# Patient Record
Sex: MALE | Race: OTHER | NOT HISPANIC OR LATINO
[De-identification: names, ages, dates, MRNs, and addresses within clinical notes are randomized per-mention and may not be internally consistent; named-entity substitution may affect disease eponyms.]

---
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
## 2021-08-16 ENCOUNTER — APPOINTMENT (OUTPATIENT)
Dept: FAMILY MEDICINE | Facility: CLINIC | Age: 21
End: 2021-08-16

## 2021-08-27 ENCOUNTER — APPOINTMENT (OUTPATIENT)
Dept: INTERNAL MEDICINE | Facility: CLINIC | Age: 21
End: 2021-08-27

## 2022-04-07 ENCOUNTER — APPOINTMENT (OUTPATIENT)
Dept: FAMILY MEDICINE | Facility: CLINIC | Age: 22
End: 2022-04-07
Payer: COMMERCIAL

## 2022-04-07 VITALS
SYSTOLIC BLOOD PRESSURE: 108 MMHG | OXYGEN SATURATION: 99 % | HEIGHT: 66 IN | DIASTOLIC BLOOD PRESSURE: 68 MMHG | BODY MASS INDEX: 18.8 KG/M2 | HEART RATE: 76 BPM | WEIGHT: 117 LBS

## 2022-04-07 DIAGNOSIS — Z00.00 ENCOUNTER FOR GENERAL ADULT MEDICAL EXAMINATION W/OUT ABNORMAL FINDINGS: ICD-10-CM

## 2022-04-07 PROCEDURE — 99395 PREV VISIT EST AGE 18-39: CPT

## 2022-04-07 NOTE — HISTORY OF PRESENT ILLNESS
[FreeTextEntry1] : annual [de-identified] : 21 yo M with no significant PMH presents for annual.\par \par Lately he has been having issue sleeping. He takes melatonin, falls asleep around 4am. If he does not take melatonin, he will go to sleep at 7am. He will wake up around 11am-12pm. Sometimes, if he does not take melatonin, he will wake at 6pm. He is trying to regulate his circadian rhythm by exercising, trying to wake early. At night, he tends to eat, mostly microwaveable meals, on his phone. He does report having a lot of thoughts. He is psychology major in college, feels he has some depression. Interested in starting SSRI.\par \par Sexually active with partner. Uses protection. Declines STD testing today.\par

## 2022-05-19 ENCOUNTER — APPOINTMENT (OUTPATIENT)
Dept: FAMILY MEDICINE | Facility: CLINIC | Age: 22
End: 2022-05-19

## 2022-07-03 ENCOUNTER — TRANSCRIPTION ENCOUNTER (OUTPATIENT)
Age: 22
End: 2022-07-03

## 2022-07-04 ENCOUNTER — EMERGENCY (EMERGENCY)
Facility: HOSPITAL | Age: 22
LOS: 1 days | Discharge: HOME | End: 2022-07-04
Attending: EMERGENCY MEDICINE | Admitting: EMERGENCY MEDICINE

## 2022-07-04 VITALS
OXYGEN SATURATION: 100 % | SYSTOLIC BLOOD PRESSURE: 124 MMHG | RESPIRATION RATE: 18 BRPM | TEMPERATURE: 98 F | DIASTOLIC BLOOD PRESSURE: 78 MMHG | HEART RATE: 90 BPM

## 2022-07-04 PROCEDURE — 99283 EMERGENCY DEPT VISIT LOW MDM: CPT

## 2022-07-04 RX ORDER — LIDOCAINE 4 G/100G
10 CREAM TOPICAL ONCE
Refills: 0 | Status: COMPLETED | OUTPATIENT
Start: 2022-07-04 | End: 2022-07-04

## 2022-07-04 RX ORDER — DEXAMETHASONE 0.5 MG/5ML
6 ELIXIR ORAL ONCE
Refills: 0 | Status: COMPLETED | OUTPATIENT
Start: 2022-07-04 | End: 2022-07-04

## 2022-07-04 RX ORDER — DEXAMETHASONE 0.5 MG/5ML
6 ELIXIR ORAL ONCE
Refills: 0 | Status: DISCONTINUED | OUTPATIENT
Start: 2022-07-04 | End: 2022-07-04

## 2022-07-04 RX ADMIN — Medication 6 MILLIGRAM(S): at 09:01

## 2022-07-04 RX ADMIN — LIDOCAINE 10 MILLILITER(S): 4 CREAM TOPICAL at 09:02

## 2022-07-04 NOTE — ED PROVIDER NOTE - PHYSICAL EXAMINATION
GEN: Patient awake alert NAD.   HEENT: normocephalic, atraumatic, EOMI, no scleral icterus, moist MM, no tonsilar exudates, no tender anterior and posterior cervical and supraclavicular LAD, + mildly erythematous mass on soft palette.   CARDIAC: RRR, S1, S2, no murmur.   PULM: CTA B/L no wheeze, rhonchi, rales.   ABD: soft NT, ND, no rebound no guarding  MSK: Moving all extremities, no edema. 5/5 strength and full ROM in all extremities.   NEURO: A&Ox3, gait normal, no focal neurological deficits, CN 2-12 grossly intact  SKIN: warm, dry, no rash.

## 2022-07-04 NOTE — ED PROVIDER NOTE - NSFOLLOWUPINSTRUCTIONS_ED_ALL_ED_FT
** You are prescribed _. Take as directed by your pharmacists.   ** Take over the counter Tylenol or Ibuprofen for pain -- follow dosing directions on original bottle.    ** Follow up with your primary care doctor in the next 72 hours.   ** A rapid follow up appointment has been requested. The coordinator will call you with an appointment time with ENT.   ** Go to the nearest Emergency Department if you experience any new or concerning symptoms, such as:   - worsening pain  - difficulty breathing  - unable to eat or drink  - fever, chills ** You have some tests pending that take 48 hours to come back. We will contact you if you have any concerning results and need additional treatment or tests.     ** Take over the counter Tylenol or Ibuprofen for pain -- follow dosing directions on original bottle.    ** Follow up with your primary care doctor in the next 72 hours.   ** A rapid follow up appointment has been requested. The coordinator will call you with an appointment time with ENT.   ** Go to the nearest Emergency Department if you experience any new or concerning symptoms, such as:   - worsening pain  - difficulty breathing  - unable to eat or drink  - fever, chills

## 2022-07-04 NOTE — ED PROVIDER NOTE - PROGRESS NOTE DETAILS
Suzie Melton M.D. Tox Fellow  Alerted by RN that patient has questions about taking decadron after taking it. Per RN, pt's mother was n the phone telling him not to take steroids because she developed pulmonary fibrosis from taking steroids. pt took decadron and is now concerned he will develop long term effects. Explained to patient that one dose of decadron in a young healthy individual with otherwise no known medical problems is low risk and will help reduct inflammation and help with his throat swelling. All questions by dad and patient answered. Pt agreeable to going home.

## 2022-07-04 NOTE — ED PROVIDER NOTE - ATTENDING CONTRIBUTION TO CARE
Patient is a 23 yo M with no chronic medical problems here for evaluation of sore throat x 2 days. Patient has a fever of 102. He states he has pain with swallowing. No drooling. No dental pain. No swelling. + cough and congestion. He took a home covid test that was negative. Denies any sick contacts. He is vaccinated. No nausea, vomiting or diarrhea.     VS noted  Gen. no acute distress, Non toxic   HEENT: EOMI, mmm, pharynx with what appears like raised papules. No exudate. No stridor. Neck is supple.   Lungs: CTAB/L no C/ W /R   CVS: RRR   Abd; Soft non tender, non distended   Ext: no edema  Skin: no rash  Neuro AAOx3 non focal clear speech  a/p: pharyngitis - ? HSV. No mouth lesions. Will send culture, give viscous lidocaine and decadron. Will refer to ENT.   - Regina BUSH

## 2022-07-04 NOTE — ED ADULT NURSE NOTE - OBJECTIVE STATEMENT
Pt c/o throat pain, fevers @ home. Pt c/o throat pain, fevers @ home. Spoke on the phone with pt's mother , explained mother the reason pt getting Decadron and Lidocaine. pt agreed to take it.

## 2022-07-04 NOTE — ED PROVIDER NOTE - PATIENT PORTAL LINK FT
You can access the FollowMyHealth Patient Portal offered by [facility] by registering at the following website: [email or web address]. By joining [employer]’s FollowMyHealth portal, you will also be able to view your health information using other applications (apps) compatible with our system.

## 2022-07-04 NOTE — ED PROVIDER NOTE - CLINICAL SUMMARY MEDICAL DECISION MAKING FREE TEXT BOX
21 yo M no sig pmhx, pw sore throat x 2 days. no exudates, + cough, no cervical tender LAD, afebrile in the ED. ddx: URI, viral pharyngitis. Pt offered but declined viral swab. Anticipate DC home with ENT followup.

## 2022-07-04 NOTE — ED PROVIDER NOTE - NS ED ROS FT
GENERAL: + fever   ENT: + sore throat  CARDIAC: no chest pain/pressure, SOB, lower extremity swelling  PULMONARY: + cough, no SOB  GI: no abdominal pain, n/v/d  SKIN: no rashes, no ecchymosis  NEURO: no headache, lightheadedness  MSK: No joint pain, myalgia, weakness.

## 2022-07-04 NOTE — ED PROVIDER NOTE - OBJECTIVE STATEMENT
23 yo M no sig pmhx, pw sore throat x 2 days. Pt endorse fever Tmax 102, currently afebrile due to ibuprofen taken prior to arrival. Pt endorse cough, nasal congestion, no gi symptoms, no loss of smell, no headache, no sick contacts at home.

## 2022-07-06 LAB
CULTURE RESULTS: SIGNIFICANT CHANGE UP
SPECIMEN SOURCE: SIGNIFICANT CHANGE UP

## 2022-07-07 ENCOUNTER — APPOINTMENT (OUTPATIENT)
Dept: FAMILY MEDICINE | Facility: CLINIC | Age: 22
End: 2022-07-07

## 2022-07-07 VITALS
DIASTOLIC BLOOD PRESSURE: 72 MMHG | OXYGEN SATURATION: 99 % | HEIGHT: 66 IN | BODY MASS INDEX: 18.96 KG/M2 | WEIGHT: 118 LBS | TEMPERATURE: 97.7 F | SYSTOLIC BLOOD PRESSURE: 117 MMHG | HEART RATE: 64 BPM

## 2022-07-07 DIAGNOSIS — J06.9 ACUTE UPPER RESPIRATORY INFECTION, UNSPECIFIED: ICD-10-CM

## 2022-07-07 PROBLEM — Z78.9 OTHER SPECIFIED HEALTH STATUS: Chronic | Status: ACTIVE | Noted: 2022-07-04

## 2022-07-07 PROCEDURE — 99214 OFFICE O/P EST MOD 30 MIN: CPT

## 2022-07-07 RX ORDER — FLUTICASONE PROPIONATE 50 UG/1
50 SPRAY, METERED NASAL
Qty: 1 | Refills: 1 | Status: ACTIVE | COMMUNITY
Start: 2022-07-07 | End: 1900-01-01

## 2022-07-07 RX ORDER — AMOXICILLIN AND CLAVULANATE POTASSIUM 875; 125 MG/1; MG/1
875-125 TABLET, COATED ORAL
Qty: 10 | Refills: 0 | Status: COMPLETED | COMMUNITY
Start: 2022-07-07 | End: 2022-07-12

## 2022-07-07 NOTE — HISTORY OF PRESENT ILLNESS
[FreeTextEntry8] : cc-- sore throat\par \par 21 yo M presents for itchy throat, congestion x 1 week. Tmax 102, 2 days fever. Neg covid PCR. Went to ED, was rx steroids. Pt reports does not help. Still with sx. Pt reports no difference in sx from last week and this week. \par \par Robitussin, mucinex not much help.

## 2022-08-05 ENCOUNTER — APPOINTMENT (OUTPATIENT)
Dept: FAMILY MEDICINE | Facility: CLINIC | Age: 22
End: 2022-08-05

## 2022-08-05 DIAGNOSIS — J02.9 ACUTE PHARYNGITIS, UNSPECIFIED: ICD-10-CM

## 2022-08-05 DIAGNOSIS — R50.9 FEVER, UNSPECIFIED: ICD-10-CM

## 2022-08-05 PROCEDURE — 99441: CPT

## 2022-08-05 NOTE — HISTORY OF PRESENT ILLNESS
[FreeTextEntry8] : Due to technical difficulties with the camera, encounter converted to telephone encounter.\par \par cc-- fever, sore throat\par \par 21 yo M with fever, sore throat, T 100.4 this morning. Sx started this morning. He took a home test was negative. He had covid last month. Works at a pool.

## 2022-08-11 ENCOUNTER — NON-APPOINTMENT (OUTPATIENT)
Age: 22
End: 2022-08-11

## 2022-10-15 ENCOUNTER — TRANSCRIPTION ENCOUNTER (OUTPATIENT)
Age: 22
End: 2022-10-15

## 2022-11-29 ENCOUNTER — APPOINTMENT (OUTPATIENT)
Dept: INTERNAL MEDICINE | Facility: CLINIC | Age: 22
End: 2022-11-29

## 2022-12-05 ENCOUNTER — APPOINTMENT (OUTPATIENT)
Dept: INTERNAL MEDICINE | Facility: CLINIC | Age: 22
End: 2022-12-05

## 2022-12-05 VITALS
SYSTOLIC BLOOD PRESSURE: 92 MMHG | WEIGHT: 115 LBS | BODY MASS INDEX: 18.48 KG/M2 | HEART RATE: 86 BPM | OXYGEN SATURATION: 99 % | HEIGHT: 66 IN | DIASTOLIC BLOOD PRESSURE: 62 MMHG

## 2022-12-05 DIAGNOSIS — L65.9 NONSCARRING HAIR LOSS, UNSPECIFIED: ICD-10-CM

## 2022-12-05 PROCEDURE — G0008: CPT

## 2022-12-05 PROCEDURE — 90686 IIV4 VACC NO PRSV 0.5 ML IM: CPT

## 2022-12-05 PROCEDURE — 90472 IMMUNIZATION ADMIN EACH ADD: CPT

## 2022-12-05 PROCEDURE — G0444 DEPRESSION SCREEN ANNUAL: CPT | Mod: 59

## 2022-12-05 PROCEDURE — 99203 OFFICE O/P NEW LOW 30 MIN: CPT | Mod: 25

## 2022-12-05 PROCEDURE — 90715 TDAP VACCINE 7 YRS/> IM: CPT

## 2022-12-05 NOTE — HEALTH RISK ASSESSMENT
[Good] : ~his/her~ current health as good [Never] : Never [No] : In the past 12 months have you used drugs other than those required for medical reasons? No [0] : 2) Feeling down, depressed, or hopeless: Not at all (0) [PHQ-2 Negative - No further assessment needed] : PHQ-2 Negative - No further assessment needed [With Family] : lives with family [Student] : student [Single] : single [Feels Safe at Home] : Feels safe at home [Fully functional (bathing, dressing, toileting, transferring, walking, feeding)] : Fully functional (bathing, dressing, toileting, transferring, walking, feeding) [Fully functional (using the telephone, shopping, preparing meals, housekeeping, doing laundry, using] : Fully functional and needs no help or supervision to perform IADLs (using the telephone, shopping, preparing meals, housekeeping, doing laundry, using transportation, managing medications and managing finances) [Smoke Detector] : smoke detector [Carbon Monoxide Detector] : carbon monoxide detector [Seat Belt] :  uses seat belt [Sunscreen] : uses sunscreen [Fair] :  ~his/her~ mood as fair [Audit-CScore] : 0 [de-identified] : not exercising now, busy with school. in school for premed [de-identified] : sometimes eating 2 meals a day. trying to gain weight. eating processed foods that are easy.  [[id number]] : 0 [HIV test declined] : HIV test declined [Hepatitis C test declined] : Hepatitis C test declined [Sexually Active] : not sexually active [High Risk Behavior] : no high risk behavior [Reports changes in hearing] : Reports no changes in hearing [Reports changes in vision] : Reports no changes in vision [Reports changes in dental health] : Reports no changes in dental health [Guns at Home] : no guns at home [de-identified] : living with family, commuting to [place] [FreeTextEntry2] : student; premed

## 2022-12-05 NOTE — PHYSICAL EXAM
[No Acute Distress] : no acute distress [Well Nourished] : well nourished [Well Developed] : well developed [Well-Appearing] : well-appearing [Normal Sclera/Conjunctiva] : normal sclera/conjunctiva [PERRL] : pupils equal round and reactive to light [EOMI] : extraocular movements intact [Normal Outer Ear/Nose] : the outer ears and nose were normal in appearance [Normal Oropharynx] : the oropharynx was normal [No JVD] : no jugular venous distention [No Lymphadenopathy] : no lymphadenopathy [Supple] : supple [Thyroid Normal, No Nodules] : the thyroid was normal and there were no nodules present [No Respiratory Distress] : no respiratory distress  [No Accessory Muscle Use] : no accessory muscle use [Clear to Auscultation] : lungs were clear to auscultation bilaterally [Normal Rate] : normal rate  [Regular Rhythm] : with a regular rhythm [Normal S1, S2] : normal S1 and S2 [No Murmur] : no murmur heard [No Varicosities] : no varicosities [No Edema] : there was no peripheral edema [No Palpable Aorta] : no palpable aorta [Soft] : abdomen soft [Non Tender] : non-tender [Non-distended] : non-distended [No Masses] : no abdominal mass palpated [No HSM] : no HSM [Normal Supraclavicular Nodes] : no supraclavicular lymphadenopathy [Normal Posterior Cervical Nodes] : no posterior cervical lymphadenopathy [Normal Anterior Cervical Nodes] : no anterior cervical lymphadenopathy [No CVA Tenderness] : no CVA  tenderness [No Spinal Tenderness] : no spinal tenderness [No Joint Swelling] : no joint swelling [Grossly Normal Strength/Tone] : grossly normal strength/tone [No Rash] : no rash [Coordination Grossly Intact] : coordination grossly intact [No Focal Deficits] : no focal deficits [Normal Gait] : normal gait [Normal Insight/Judgement] : insight and judgment were intact [de-identified] : anxious affect

## 2022-12-05 NOTE — ASSESSMENT
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 Anxiety\par -BH referral given to pt\par -declined medication\par -d/w pt therapy confidential, need not share with any other person\par \par 2. HCM\par -HIV/HCV screening\par -tdap - adacel today\par -flu vaccine - fluzone today\par -COVID vaccine - completed, booster completed\par

## 2022-12-05 NOTE — HISTORY OF PRESENT ILLNESS
[FreeTextEntry1] : Visit to establish care with new primary care doctor\par Reports CPE not covered, last was in 04/2022 with Dr. Arguello. \par New patient appointment.  [de-identified] : 22M presents for new patient appointment\par \par Reviewed note from Dr. Arguello 08/05/2022  and 04/07/2022 - issues sleeping, taking melatonin, psychology major in college, depressive symptoms and SSR started, eating microwavable meals, phone use\par \par Patient noticing hair thinning, and falling out. Noticing hair fall in shower. He started noticing it in June. Reports anxiety, insomnia. Was suggested to see therapist by friends. They say they aren't professionals. Pt reluctant due to stigma. Reports sertraline caused vomiting in the past. \par \par PMH: none\par PSH: none\par FH: parents are healthy, no medical problems\par SH: see below\par \par Medications: none\par Allergies: none\par

## 2023-03-10 ENCOUNTER — APPOINTMENT (OUTPATIENT)
Dept: INTERNAL MEDICINE | Facility: CLINIC | Age: 23
End: 2023-03-10
Payer: COMMERCIAL

## 2023-03-10 VITALS
HEIGHT: 66 IN | WEIGHT: 117 LBS | DIASTOLIC BLOOD PRESSURE: 62 MMHG | TEMPERATURE: 97.5 F | OXYGEN SATURATION: 98 % | SYSTOLIC BLOOD PRESSURE: 108 MMHG | HEART RATE: 90 BPM | BODY MASS INDEX: 18.8 KG/M2

## 2023-03-10 DIAGNOSIS — F32.A ANXIETY DISORDER, UNSPECIFIED: ICD-10-CM

## 2023-03-10 DIAGNOSIS — E55.9 VITAMIN D DEFICIENCY, UNSPECIFIED: ICD-10-CM

## 2023-03-10 DIAGNOSIS — F41.9 ANXIETY DISORDER, UNSPECIFIED: ICD-10-CM

## 2023-03-10 PROCEDURE — 99213 OFFICE O/P EST LOW 20 MIN: CPT | Mod: 25

## 2023-03-10 PROCEDURE — 99395 PREV VISIT EST AGE 18-39: CPT | Mod: 25

## 2023-03-10 RX ORDER — HYDROXYZINE HYDROCHLORIDE 25 MG/1
25 TABLET ORAL
Qty: 60 | Refills: 1 | Status: ACTIVE | COMMUNITY
Start: 2022-04-07 | End: 1900-01-01

## 2023-03-10 RX ORDER — SERTRALINE 25 MG/1
25 TABLET, FILM COATED ORAL
Qty: 60 | Refills: 1 | Status: COMPLETED | COMMUNITY
Start: 2022-04-07 | End: 2023-03-10

## 2023-03-10 NOTE — HISTORY OF PRESENT ILLNESS
[FreeTextEntry8] : cc-- chest pain, physical\par \par 21 yo M with depression, having chest pains for past few days, started smoking cigarettes for past 2 weeks. Chest pain is sharp, has SOB with deep breathing. No tenderness point or reproducible pain. No FMH blood clots. Denies any calf tenderness/swelling. Now neck pain. Follows with therapist at school, but not consistent therapist. Not finding it helpful. Was on sertraline in past, but did not tolerate med so he stopped. Hydroxyzine did help with sleep but was too groggy. He finds himself drinking and smoking. 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 + stressors.

## 2023-03-13 LAB
25(OH)D3 SERPL-MCNC: 16.6 NG/ML
ALBUMIN SERPL ELPH-MCNC: 5.2 G/DL
ALP BLD-CCNC: 93 U/L
ALT SERPL-CCNC: 14 U/L
ANION GAP SERPL CALC-SCNC: 15 MMOL/L
AST SERPL-CCNC: 18 U/L
BASOPHILS # BLD AUTO: 0.02 K/UL
BASOPHILS NFR BLD AUTO: 0.3 %
BILIRUB SERPL-MCNC: 0.5 MG/DL
BUN SERPL-MCNC: 16 MG/DL
CALCIUM SERPL-MCNC: 10.4 MG/DL
CHLORIDE SERPL-SCNC: 105 MMOL/L
CHOLEST SERPL-MCNC: 171 MG/DL
CO2 SERPL-SCNC: 24 MMOL/L
CREAT SERPL-MCNC: 0.91 MG/DL
EGFR: 123 ML/MIN/1.73M2
EOSINOPHIL # BLD AUTO: 0.04 K/UL
EOSINOPHIL NFR BLD AUTO: 0.6 %
ESTIMATED AVERAGE GLUCOSE: 108 MG/DL
GLUCOSE SERPL-MCNC: 82 MG/DL
HBA1C MFR BLD HPLC: 5.4 %
HCT VFR BLD CALC: 46.6 %
HDLC SERPL-MCNC: 52 MG/DL
HGB BLD-MCNC: 15 G/DL
IMM GRANULOCYTES NFR BLD AUTO: 0.5 %
LDLC SERPL CALC-MCNC: 89 MG/DL
LYMPHOCYTES # BLD AUTO: 1.76 K/UL
LYMPHOCYTES NFR BLD AUTO: 26.9 %
MAN DIFF?: NORMAL
MCHC RBC-ENTMCNC: 29.1 PG
MCHC RBC-ENTMCNC: 32.2 GM/DL
MCV RBC AUTO: 90.5 FL
MONOCYTES # BLD AUTO: 0.46 K/UL
MONOCYTES NFR BLD AUTO: 7 %
NEUTROPHILS # BLD AUTO: 4.23 K/UL
NEUTROPHILS NFR BLD AUTO: 64.7 %
NONHDLC SERPL-MCNC: 119 MG/DL
PLATELET # BLD AUTO: 311 K/UL
POTASSIUM SERPL-SCNC: 4 MMOL/L
PROT SERPL-MCNC: 7 G/DL
RBC # BLD: 5.15 M/UL
RBC # FLD: 12.5 %
SODIUM SERPL-SCNC: 143 MMOL/L
TRIGL SERPL-MCNC: 147 MG/DL
TSH SERPL-ACNC: 0.82 UIU/ML
VIT B12 SERPL-MCNC: 331 PG/ML
WBC # FLD AUTO: 6.54 K/UL

## 2023-03-14 ENCOUNTER — NON-APPOINTMENT (OUTPATIENT)
Age: 23
End: 2023-03-14

## 2023-03-16 ENCOUNTER — TRANSCRIPTION ENCOUNTER (OUTPATIENT)
Age: 23
End: 2023-03-16

## 2023-04-11 ENCOUNTER — APPOINTMENT (OUTPATIENT)
Dept: INTERNAL MEDICINE | Facility: CLINIC | Age: 23
End: 2023-04-11

## 2023-07-18 ENCOUNTER — APPOINTMENT (OUTPATIENT)
Dept: INTERNAL MEDICINE | Facility: CLINIC | Age: 23
End: 2023-07-18
Payer: COMMERCIAL

## 2023-07-18 VITALS
SYSTOLIC BLOOD PRESSURE: 108 MMHG | HEART RATE: 76 BPM | OXYGEN SATURATION: 98 % | BODY MASS INDEX: 20.25 KG/M2 | WEIGHT: 126 LBS | DIASTOLIC BLOOD PRESSURE: 58 MMHG | HEIGHT: 66 IN

## 2023-07-18 DIAGNOSIS — R07.1 CHEST PAIN ON BREATHING: ICD-10-CM

## 2023-07-18 DIAGNOSIS — F17.200 NICOTINE DEPENDENCE, UNSPECIFIED, UNCOMPLICATED: ICD-10-CM

## 2023-07-18 PROCEDURE — 99214 OFFICE O/P EST MOD 30 MIN: CPT

## 2023-07-18 RX ORDER — BUPROPION HYDROCHLORIDE 150 MG/1
150 TABLET, EXTENDED RELEASE ORAL
Qty: 90 | Refills: 3 | Status: ACTIVE | COMMUNITY
Start: 2023-07-18 | End: 1900-01-01

## 2023-07-18 NOTE — HISTORY OF PRESENT ILLNESS
[FreeTextEntry1] : chest pain [de-identified] : 22 yo M with chest pain over left side of chest for past month. Smokes 3-4 cigs/day. No SOB, no TOTH. Not worse with movement. No cough. + stressors. \par \par Pt smokes due to stress. Wants to quit smoking. Interested in trying wellbutrin.

## 2023-12-21 ENCOUNTER — APPOINTMENT (OUTPATIENT)
Dept: INTERNAL MEDICINE | Facility: CLINIC | Age: 23
End: 2023-12-21
Payer: COMMERCIAL

## 2023-12-21 VITALS
SYSTOLIC BLOOD PRESSURE: 100 MMHG | TEMPERATURE: 96 F | HEART RATE: 93 BPM | DIASTOLIC BLOOD PRESSURE: 60 MMHG | BODY MASS INDEX: 18.8 KG/M2 | WEIGHT: 117 LBS | HEIGHT: 66 IN | OXYGEN SATURATION: 99 %

## 2023-12-21 DIAGNOSIS — J34.89 OTHER SPECIFIED DISORDERS OF NOSE AND NASAL SINUSES: ICD-10-CM

## 2023-12-21 DIAGNOSIS — R05.9 COUGH, UNSPECIFIED: ICD-10-CM

## 2023-12-21 DIAGNOSIS — R09.81 NASAL CONGESTION: ICD-10-CM

## 2023-12-21 PROCEDURE — 99214 OFFICE O/P EST MOD 30 MIN: CPT

## 2023-12-21 RX ORDER — BENZONATATE 100 MG/1
100 CAPSULE ORAL 3 TIMES DAILY
Qty: 60 | Refills: 0 | Status: DISCONTINUED | COMMUNITY
Start: 2022-08-11 | End: 2023-12-21

## 2023-12-21 RX ORDER — AMOXICILLIN AND CLAVULANATE POTASSIUM 875; 125 MG/1; MG/1
875-125 TABLET, COATED ORAL
Qty: 14 | Refills: 0 | Status: ACTIVE | COMMUNITY
Start: 2023-12-21 | End: 1900-01-01

## 2023-12-21 NOTE — HISTORY OF PRESENT ILLNESS
[Cold Symptoms] : cold symptoms [Moderate] : moderate [___ Weeks ago] :  [unfilled] weeks ago [Congestion] : congestion [Cough] : cough [Shortness Of Breath] : no shortness of breath [Fever] : no fever [OTC Remedies] : OTC remedies [Worsening] : worsening

## 2023-12-21 NOTE — REVIEW OF SYSTEMS
Patient is here today for follow up of right great toe pain. He was in [place] on vacation riding mountain bikes, when he fell falling onto his right great toe. This happened on 8/3.  He has had some delayed healing and is here for x-rays and exam.  Exam today shows no point tenderness, full painless range of motion, normal pulses and sensation.    X-rays done and images viewed by me show a well healing Salter Manzo II fracture of the right great toe.  Patient may continue or resume activities as tolerated.  Return to clinic prn.      [Cough] : cough

## 2023-12-21 NOTE — ASSESSMENT
[FreeTextEntry1] : BRONCHITIS   DID a COVID PCR test, AND STREP THROAT TEST  done at office today. F/U 1 WK IF NOT BETTER If patient develops SOB, patient will call me All questions and concerns were discussed.  amox RX SENT DIRECTIONS DISCUSSED AND EXPLAINED WITH PATIENT

## 2023-12-21 NOTE — PHYSICAL EXAM
[Normal TMs] : both tympanic membranes were normal [Clear to Auscultation] : lungs were clear to auscultation bilaterally [Normal S1, S2] : normal S1 and S2 [Normal] : affect was normal and insight and judgment were intact

## 2023-12-22 LAB
INFLUENZA A RESULT: NOT DETECTED
INFLUENZA B RESULT: NOT DETECTED
RESP SYN VIRUS RESULT: NOT DETECTED
S PYO DNA THROAT QL NAA+PROBE: NOT DETECTED
SARS-COV-2 RESULT: NOT DETECTED

## 2024-04-05 ENCOUNTER — APPOINTMENT (OUTPATIENT)
Dept: INTERNAL MEDICINE | Facility: CLINIC | Age: 24
End: 2024-04-05
Payer: COMMERCIAL

## 2024-04-05 VITALS
OXYGEN SATURATION: 98 % | SYSTOLIC BLOOD PRESSURE: 118 MMHG | BODY MASS INDEX: 19.61 KG/M2 | HEART RATE: 88 BPM | WEIGHT: 122 LBS | DIASTOLIC BLOOD PRESSURE: 71 MMHG | HEIGHT: 66 IN

## 2024-04-05 DIAGNOSIS — Z02.89 ENCOUNTER FOR OTHER ADMINISTRATIVE EXAMINATIONS: ICD-10-CM

## 2024-04-05 PROCEDURE — G2211 COMPLEX E/M VISIT ADD ON: CPT

## 2024-04-05 PROCEDURE — 99213 OFFICE O/P EST LOW 20 MIN: CPT

## 2024-04-05 NOTE — HISTORY OF PRESENT ILLNESS
[FreeTextEntry1] : health certification form completion [de-identified] : 25 yo M with anxiety/depression presents for health certification form completion.  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 Pt has no hx cough/abnormal weight loss. Pt not sexually active. Declines STD testing.

## 2024-06-18 ENCOUNTER — APPOINTMENT (OUTPATIENT)
Dept: INTERNAL MEDICINE | Facility: CLINIC | Age: 24
End: 2024-06-18

## 2024-12-18 ENCOUNTER — APPOINTMENT (OUTPATIENT)
Dept: INTERNAL MEDICINE | Facility: CLINIC | Age: 24
End: 2024-12-18
Payer: COMMERCIAL

## 2024-12-18 VITALS
HEART RATE: 69 BPM | DIASTOLIC BLOOD PRESSURE: 73 MMHG | TEMPERATURE: 97.9 F | SYSTOLIC BLOOD PRESSURE: 125 MMHG | OXYGEN SATURATION: 100 % | BODY MASS INDEX: 19.29 KG/M2 | HEIGHT: 66 IN | WEIGHT: 120 LBS

## 2024-12-18 DIAGNOSIS — Z00.00 ENCOUNTER FOR GENERAL ADULT MEDICAL EXAMINATION W/OUT ABNORMAL FINDINGS: ICD-10-CM

## 2024-12-18 PROCEDURE — 99395 PREV VISIT EST AGE 18-39: CPT

## 2025-03-17 ENCOUNTER — APPOINTMENT (OUTPATIENT)
Dept: INTERNAL MEDICINE | Facility: CLINIC | Age: 25
End: 2025-03-17
Payer: COMMERCIAL

## 2025-03-17 VITALS
WEIGHT: 123 LBS | HEART RATE: 69 BPM | TEMPERATURE: 98.1 F | HEIGHT: 66 IN | DIASTOLIC BLOOD PRESSURE: 73 MMHG | OXYGEN SATURATION: 100 % | BODY MASS INDEX: 19.77 KG/M2 | RESPIRATION RATE: 15 BRPM | SYSTOLIC BLOOD PRESSURE: 123 MMHG

## 2025-03-17 DIAGNOSIS — F32.A ANXIETY DISORDER, UNSPECIFIED: ICD-10-CM

## 2025-03-17 DIAGNOSIS — F41.9 ANXIETY DISORDER, UNSPECIFIED: ICD-10-CM

## 2025-03-17 DIAGNOSIS — Z00.00 ENCOUNTER FOR GENERAL ADULT MEDICAL EXAMINATION W/OUT ABNORMAL FINDINGS: ICD-10-CM

## 2025-03-17 DIAGNOSIS — E55.9 VITAMIN D DEFICIENCY, UNSPECIFIED: ICD-10-CM

## 2025-03-17 PROCEDURE — 99213 OFFICE O/P EST LOW 20 MIN: CPT

## 2025-03-17 PROCEDURE — G2211 COMPLEX E/M VISIT ADD ON: CPT

## 2025-07-02 ENCOUNTER — INPATIENT (INPATIENT)
Facility: HOSPITAL | Age: 25
LOS: 5 days | Discharge: HOME | End: 2025-07-08
Attending: PSYCHIATRY & NEUROLOGY | Admitting: PSYCHIATRY & NEUROLOGY
Payer: COMMERCIAL

## 2025-07-02 ENCOUNTER — EMERGENCY (EMERGENCY)
Facility: HOSPITAL | Age: 25
LOS: 1 days | End: 2025-07-02
Attending: STUDENT IN AN ORGANIZED HEALTH CARE EDUCATION/TRAINING PROGRAM
Payer: COMMERCIAL

## 2025-07-02 VITALS
SYSTOLIC BLOOD PRESSURE: 134 MMHG | DIASTOLIC BLOOD PRESSURE: 75 MMHG | HEART RATE: 78 BPM | RESPIRATION RATE: 18 BRPM | TEMPERATURE: 99 F | OXYGEN SATURATION: 97 %

## 2025-07-02 VITALS — HEIGHT: 67 IN | WEIGHT: 121.25 LBS

## 2025-07-02 VITALS
TEMPERATURE: 99 F | RESPIRATION RATE: 18 BRPM | HEIGHT: 67 IN | OXYGEN SATURATION: 98 % | WEIGHT: 125 LBS | DIASTOLIC BLOOD PRESSURE: 76 MMHG | SYSTOLIC BLOOD PRESSURE: 148 MMHG | HEART RATE: 92 BPM

## 2025-07-02 DIAGNOSIS — F32.9 MAJOR DEPRESSIVE DISORDER, SINGLE EPISODE, UNSPECIFIED: ICD-10-CM

## 2025-07-02 DIAGNOSIS — F10.10 ALCOHOL ABUSE, UNCOMPLICATED: ICD-10-CM

## 2025-07-02 DIAGNOSIS — F10.20 ALCOHOL DEPENDENCE, UNCOMPLICATED: ICD-10-CM

## 2025-07-02 DIAGNOSIS — F33.2 MAJOR DEPRESSIVE DISORDER, RECURRENT SEVERE WITHOUT PSYCHOTIC FEATURES: ICD-10-CM

## 2025-07-02 LAB
AMPHET UR-MCNC: NEGATIVE — SIGNIFICANT CHANGE UP
ANION GAP SERPL CALC-SCNC: 17 MMOL/L — SIGNIFICANT CHANGE UP (ref 5–17)
APAP SERPL-MCNC: <15 UG/ML — SIGNIFICANT CHANGE UP (ref 10–30)
BARBITURATES UR SCN-MCNC: NEGATIVE — SIGNIFICANT CHANGE UP
BASOPHILS # BLD AUTO: 0.04 K/UL — SIGNIFICANT CHANGE UP (ref 0–0.2)
BASOPHILS NFR BLD AUTO: 0.6 % — SIGNIFICANT CHANGE UP (ref 0–2)
BENZODIAZ UR-MCNC: NEGATIVE — SIGNIFICANT CHANGE UP
BUN SERPL-MCNC: 12 MG/DL — SIGNIFICANT CHANGE UP (ref 7–23)
CALCIUM SERPL-MCNC: 9.4 MG/DL — SIGNIFICANT CHANGE UP (ref 8.4–10.5)
CHLORIDE SERPL-SCNC: 104 MMOL/L — SIGNIFICANT CHANGE UP (ref 96–108)
CO2 SERPL-SCNC: 22 MMOL/L — SIGNIFICANT CHANGE UP (ref 22–31)
COCAINE METAB.OTHER UR-MCNC: NEGATIVE — SIGNIFICANT CHANGE UP
CREAT SERPL-MCNC: 0.99 MG/DL — SIGNIFICANT CHANGE UP (ref 0.5–1.3)
EGFR: 109 ML/MIN/1.73M2 — SIGNIFICANT CHANGE UP
EGFR: 109 ML/MIN/1.73M2 — SIGNIFICANT CHANGE UP
EOSINOPHIL # BLD AUTO: 0.04 K/UL — SIGNIFICANT CHANGE UP (ref 0–0.5)
EOSINOPHIL NFR BLD AUTO: 0.6 % — SIGNIFICANT CHANGE UP (ref 0–6)
ETHANOL SERPL-MCNC: 150 MG/DL — HIGH (ref 0–10)
ETHANOL SERPL-MCNC: 234 MG/DL — HIGH (ref 0–10)
GLUCOSE SERPL-MCNC: 95 MG/DL — SIGNIFICANT CHANGE UP (ref 70–99)
HCT VFR BLD CALC: 42.8 % — SIGNIFICANT CHANGE UP (ref 39–50)
HGB BLD-MCNC: 14.3 G/DL — SIGNIFICANT CHANGE UP (ref 13–17)
IMM GRANULOCYTES # BLD AUTO: 0.03 K/UL — SIGNIFICANT CHANGE UP (ref 0–0.07)
IMM GRANULOCYTES NFR BLD AUTO: 0.4 % — SIGNIFICANT CHANGE UP (ref 0–0.9)
LYMPHOCYTES # BLD AUTO: 1.67 K/UL — SIGNIFICANT CHANGE UP (ref 1–3.3)
LYMPHOCYTES NFR BLD AUTO: 23.2 % — SIGNIFICANT CHANGE UP (ref 13–44)
MCHC RBC-ENTMCNC: 30.4 PG — SIGNIFICANT CHANGE UP (ref 27–34)
MCHC RBC-ENTMCNC: 33.4 G/DL — SIGNIFICANT CHANGE UP (ref 32–36)
MCV RBC AUTO: 90.9 FL — SIGNIFICANT CHANGE UP (ref 80–100)
METHADONE UR-MCNC: NEGATIVE — SIGNIFICANT CHANGE UP
MONOCYTES # BLD AUTO: 0.35 K/UL — SIGNIFICANT CHANGE UP (ref 0–0.9)
MONOCYTES NFR BLD AUTO: 4.9 % — SIGNIFICANT CHANGE UP (ref 2–14)
NEUTROPHILS # BLD AUTO: 5.08 K/UL — SIGNIFICANT CHANGE UP (ref 1.8–7.4)
NEUTROPHILS NFR BLD AUTO: 70.3 % — SIGNIFICANT CHANGE UP (ref 43–77)
NRBC # BLD AUTO: 0 K/UL — SIGNIFICANT CHANGE UP (ref 0–0)
NRBC # FLD: 0 K/UL — SIGNIFICANT CHANGE UP (ref 0–0)
NRBC BLD AUTO-RTO: 0 /100 WBCS — SIGNIFICANT CHANGE UP (ref 0–0)
OPIATES UR-MCNC: NEGATIVE — SIGNIFICANT CHANGE UP
OXYCODONE UR-MCNC: NEGATIVE — SIGNIFICANT CHANGE UP
PCP SPEC-MCNC: SIGNIFICANT CHANGE UP
PCP UR-MCNC: NEGATIVE — SIGNIFICANT CHANGE UP
PLATELET # BLD AUTO: 368 K/UL — SIGNIFICANT CHANGE UP (ref 150–400)
PMV BLD: 8.6 FL — SIGNIFICANT CHANGE UP (ref 7–13)
POTASSIUM SERPL-MCNC: 3.8 MMOL/L — SIGNIFICANT CHANGE UP (ref 3.5–5.3)
POTASSIUM SERPL-SCNC: 3.8 MMOL/L — SIGNIFICANT CHANGE UP (ref 3.5–5.3)
RBC # BLD: 4.71 M/UL — SIGNIFICANT CHANGE UP (ref 4.2–5.8)
RBC # FLD: 12.2 % — SIGNIFICANT CHANGE UP (ref 10.3–14.5)
SALICYLATES SERPL-MCNC: <2 MG/DL — LOW (ref 15–30)
SARS-COV-2 RNA SPEC QL NAA+PROBE: SIGNIFICANT CHANGE UP
SODIUM SERPL-SCNC: 143 MMOL/L — SIGNIFICANT CHANGE UP (ref 135–145)
THC UR QL: NEGATIVE — SIGNIFICANT CHANGE UP
WBC # BLD: 7.21 K/UL — SIGNIFICANT CHANGE UP (ref 3.8–10.5)
WBC # FLD AUTO: 7.21 K/UL — SIGNIFICANT CHANGE UP (ref 3.8–10.5)

## 2025-07-02 PROCEDURE — 80048 BASIC METABOLIC PNL TOTAL CA: CPT

## 2025-07-02 PROCEDURE — 99285 EMERGENCY DEPT VISIT HI MDM: CPT

## 2025-07-02 PROCEDURE — 99223 1ST HOSP IP/OBS HIGH 75: CPT

## 2025-07-02 PROCEDURE — 87635 SARS-COV-2 COVID-19 AMP PRB: CPT

## 2025-07-02 PROCEDURE — 93005 ELECTROCARDIOGRAM TRACING: CPT

## 2025-07-02 PROCEDURE — 85025 COMPLETE CBC W/AUTO DIFF WBC: CPT

## 2025-07-02 PROCEDURE — 36415 COLL VENOUS BLD VENIPUNCTURE: CPT

## 2025-07-02 PROCEDURE — 93010 ELECTROCARDIOGRAM REPORT: CPT

## 2025-07-02 PROCEDURE — G0378: CPT

## 2025-07-02 PROCEDURE — 99053 MED SERV 10PM-8AM 24 HR FAC: CPT

## 2025-07-02 PROCEDURE — 80307 DRUG TEST PRSMV CHEM ANLYZR: CPT

## 2025-07-02 PROCEDURE — 99285 EMERGENCY DEPT VISIT HI MDM: CPT | Mod: 25

## 2025-07-02 RX ORDER — TRAZODONE HCL 100 MG
50 TABLET ORAL AT BEDTIME
Refills: 0 | Status: DISCONTINUED | OUTPATIENT
Start: 2025-07-02 | End: 2025-07-08

## 2025-07-02 RX ORDER — ACETAMINOPHEN 500 MG/5ML
650 LIQUID (ML) ORAL EVERY 6 HOURS
Refills: 0 | Status: DISCONTINUED | OUTPATIENT
Start: 2025-07-02 | End: 2025-07-02

## 2025-07-02 RX ORDER — LORAZEPAM 4 MG/ML
2 VIAL (ML) INJECTION
Refills: 0 | Status: DISCONTINUED | OUTPATIENT
Start: 2025-07-02 | End: 2025-07-08

## 2025-07-02 RX ORDER — NICOTINE POLACRILEX 4 MG/1
1 GUM, CHEWING ORAL ONCE
Refills: 0 | Status: COMPLETED | OUTPATIENT
Start: 2025-07-02 | End: 2025-07-02

## 2025-07-02 RX ORDER — NICOTINE POLACRILEX 4 MG/1
2 GUM, CHEWING ORAL
Refills: 0 | Status: DISCONTINUED | OUTPATIENT
Start: 2025-07-02 | End: 2025-07-08

## 2025-07-02 RX ORDER — NICOTINE POLACRILEX 4 MG/1
1 GUM, CHEWING ORAL DAILY
Refills: 0 | Status: DISCONTINUED | OUTPATIENT
Start: 2025-07-02 | End: 2025-07-08

## 2025-07-02 RX ORDER — HYDROXYZINE HYDROCHLORIDE 25 MG/1
50 TABLET, FILM COATED ORAL EVERY 4 HOURS
Refills: 0 | Status: DISCONTINUED | OUTPATIENT
Start: 2025-07-02 | End: 2025-07-08

## 2025-07-02 RX ORDER — DIPHENHYDRAMINE HCL 12.5MG/5ML
50 ELIXIR ORAL ONCE
Refills: 0 | Status: DISCONTINUED | OUTPATIENT
Start: 2025-07-02 | End: 2025-07-08

## 2025-07-02 RX ORDER — MAGNESIUM HYDROXIDE 400 MG/5ML
30 SUSPENSION ORAL DAILY
Refills: 0 | Status: DISCONTINUED | OUTPATIENT
Start: 2025-07-02 | End: 2025-07-08

## 2025-07-02 RX ORDER — ACETAMINOPHEN 500 MG/5ML
650 LIQUID (ML) ORAL EVERY 6 HOURS
Refills: 0 | Status: DISCONTINUED | OUTPATIENT
Start: 2025-07-02 | End: 2025-07-08

## 2025-07-02 RX ORDER — MAGNESIUM, ALUMINUM HYDROXIDE 200-200 MG
30 TABLET,CHEWABLE ORAL EVERY 6 HOURS
Refills: 0 | Status: DISCONTINUED | OUTPATIENT
Start: 2025-07-02 | End: 2025-07-08

## 2025-07-02 RX ORDER — ONDANSETRON HCL/PF 4 MG/2 ML
4 VIAL (ML) INJECTION THREE TIMES A DAY
Refills: 0 | Status: DISCONTINUED | OUTPATIENT
Start: 2025-07-02 | End: 2025-07-08

## 2025-07-02 RX ADMIN — NICOTINE POLACRILEX 1 PATCH: 4 GUM, CHEWING ORAL at 16:23

## 2025-07-02 NOTE — BH PATIENT PROFILE - NSFLUVACAGEDISCH_IMM_ALL_CORE
"Goal Outcome Evaluation:     Plan of Care: reviewed with patient      DATE & TIME: 5/10/22   Cognitive Concerns/ Orientation : A/Ox4, baseline mentation. Particular with cares/needs, able to verbally express wishes. Seems a little more sad,anxious this shift than in previous days.  BEHAVIOR & AGGRESSION TOOL COLOR: Green   ABNL VS/O2: VSS on RA (once per day)   MOBILITY: Independent, amb freq in [place], prefers to walk with staff.  PAIN MANAGMENT: Denied pain  DIET: Regular - good appetite.  BOWEL/BLADDER: Continent. No BM this shift, passing gas.  SKIN: Blanchable redness on buttocks.  D/C DATE: Discharge pending placement to group home.  OTHER IMPORTANT INFO: Pt's late mom performed BR cares/hygiene prior to admission per Maxi (brother/guardian). Took a \"tub\" with much encouragement and acknowledgement of fears.    " Adult

## 2025-07-02 NOTE — CHART NOTE - NSCHARTNOTEFT_GEN_A_CORE
Medical Screening Evaluation    Patient Admitted from: [facility] ED    [facility] admitting diagnosis: Major depressive disorder with single episode      PAST MEDICAL & SURGICAL HISTORY:  No pertinent past medical history  No significant past surgical history    ALLERGIES:  No Known Allergies    SOCIAL HISTORY:  Patient reports daily use of vape/nicotine and alcohol. Patient denies use of other substances     FAMILY HISTORY:  No known pertinent family history in 1st degree relatives      MEDICATIONS  (STANDING):  nicotine -  14 mG/24Hr(s) Patch 1 Patch Transdermal daily    MEDICATIONS  (PRN):  acetaminophen     Tablet .. 650 milliGRAM(s) Oral every 6 hours PRN Temp greater or equal to 38C (100.4F), Mild Pain (1 - 3)  aluminum hydroxide/magnesium hydroxide/simethicone Suspension 30 milliLiter(s) Oral every 6 hours PRN Dyspepsia  diphenhydrAMINE Injectable 50 milliGRAM(s) IntraMuscular once PRN severe agitation due to mood episode  hydrOXYzine hydrochloride 50 milliGRAM(s) Oral every 4 hours PRN Anxiety  LORazepam     Tablet 2 milliGRAM(s) Oral every 2 hours PRN for CIWA score 8 to 12  LORazepam   Injectable 2 milliGRAM(s) IntraMuscular every 2 hours PRN for CIWA score 13 or higher  magnesium hydroxide Suspension 30 milliLiter(s) Oral daily PRN Constipation  nicotine  Polacrilex Gum 2 milliGRAM(s) Oral every 3 hours PRN breakthrough cravings  ondansetron    Tablet 4 milliGRAM(s) Oral three times a day PRN nausea  traZODone 50 milliGRAM(s) Oral at bedtime PRN insomnia      Vital Signs Last 24 Hrs  T(C): 37 (02 Jul 2025 17:40), Max: 37.1 (02 Jul 2025 15:08)  T(F): 98.6 (02 Jul 2025 17:40), Max: 98.8 (02 Jul 2025 15:08)  HR: 78 (02 Jul 2025 17:40) (73 - 92)  BP: 134/75 (02 Jul 2025 17:40) (113/76 - 148/76)  BP(mean): --  RR: 18 (02 Jul 2025 17:40) (16 - 18)  SpO2: 97% (02 Jul 2025 17:40) (97% - 99%)      PHYSICAL EXAM:  GENERAL: NAD  HEAD: Atraumatic, Normocephalic  EYES: EOMI, PERRLA, conjunctiva and sclera clear  NECK: Supple, No JVD  CHEST/LUNG: Clear to auscultation bilaterally; No wheeze  HEART: Regular rate and rhythm; No murmurs, rubs, or gallops  ABDOMEN: Soft, Nontender, Nondistended; Bowel sounds present  EXTREMITIES: 2+ Peripheral Pulses, No clubbing, cyanosis, or edema  NEUROLOGY: non-focal  SKIN: No rashes or lesions      LABS:                        14.3   7.21  )-----------( 368      ( 02 Jul 2025 02:28 )             42.8     07-02    143  |  104  |  12  ----------------------------<  95  3.8   |  22  |  0.99    Ca    9.4      02 Jul 2025 02:28      Urinalysis Basic - ( 02 Jul 2025 02:28 )  Color: x / Appearance: x / SG: x / pH: x  Gluc: 95 mg/dL / Ketone: x  / Bili: x / Urobili: x   Blood: x / Protein: x / Nitrite: x   Leuk Esterase: x / RBC: x / WBC x   Sq Epi: x / Non Sq Epi: x / Bacteria: x      Assessment and Plan:  25F admitted to [facility] for Major depressive disorder with single episode w/ no known significant PMHx seen at bedside for medical screening evaluation. Patient has no acute medical complaints at this time. Patient denies fever, chills, headache, dizziness, lightheadedness, N/V, SOB, cough, congestion, chest pain, abdominal pain, dysuria, hematuria, diarrhea, constipation. Physical exam unremarkable, VSS. CBC, CMP WNL. EKG pending.    1.) Major depressive disorder with single episode: Refer to primary team documentation for recs

## 2025-07-02 NOTE — BH PATIENT PROFILE - FALL HARM RISK - UNIVERSAL INTERVENTIONS
Bed in lowest position, wheels locked, appropriate side rails in place/Call bell, personal items and telephone in reach/Instruct patient to call for assistance before getting out of bed or chair/Non-slip footwear when patient is out of bed/[place] to call system/Physically safe environment - no spills, clutter or unnecessary equipment/Purposeful Proactive Rounding/Room/bathroom lighting operational, light cord in reach

## 2025-07-02 NOTE — ED PROVIDER NOTE - ATTENDING CONTRIBUTION TO CARE
I was the supervising attending. I have independently seen face-to-face and examined the patient with the resident. I have reviewed the history and physical and discussed the MDM with the resident. I agree with the assessment and plan as presented unless otherwise documented as follows:    25M denies PMH/PSH, no prior psychiatric history, presenting w/ SI. History obtained from patient at bedside, endorses worsening sadness since death of his cat this week but states he has struggled with on/off depressive symptoms over multiple years. Has also had worsening problems with drinking alcohol, typically daily/multiple drinks, over the last few months. Drank multiple alcoholic drinks tonight and subsequently told his friend he wanted to jump off a bridge, so the friend called EMS. At current time patient denies SI/HI, AH/VH. Has previously had thoughts of SI and intentionally took pills 6 months ago to try to end his life but states he didn't tell anybody. Appears well, AOx3, not in acute distress, mildly clinically intoxicated. Lungs CTABL, normal heart sounds, abdomen soft and NT/ND. C/f active SI w/ plan, will obtain medical screening labs/UA. Will consult psychiatry. -Albina Mello MD (Attending)
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
## 2025-07-02 NOTE — ED BEHAVIORAL HEALTH ASSESSMENT NOTE - RISK ASSESSMENT
Risk factors: recent SA, prior SA, depression, recent loss/bereavement, active substance abuse, not receiving treatment    Protective factors: no h/o psych admissions, good physical health, engaged in work, domiciled, social supports, help-seeking behaviors    Overall, pt is at high acute risk of harm and meets criteria for psychiatric admission for safety and stabilization.

## 2025-07-02 NOTE — BH PATIENT PROFILE - FALL HARM RISK - ATTEMPT OOB
After Visit Summary   5/4/2018    Yvorse CASTAÑEDA [facility]    MRN: [id number]           Patient Information     Date Of Birth          1987        Visit Information        Provider Department      5/4/2018 1:15 PM Agustina Santacruz PA [facility]        Today's Diagnoses     Need for vaccination    -  1    Vaginitis and vulvovaginitis          Care Instructions      Thank you for choosing [facility].   I have office hours 8:00 am to 4:30 pm on Monday's, Wednesday's, Thursday's and Friday's. My nurse and I are out of the office every Tuesday.    Following your visit, when your labs and diagnostic testing have returned, I will review then and you will be contacted by my nurse.  If you are on My Chart, you can also view results there.    For refills, notify your pharmacy regarding what you need and the pharmacy will generate a refill request. Do not call my nurse as she is unable to process refill request. Please plan ahead and allow 3-5 days for refill requests.    You will generally receive a reminder call the day prior to your appointment.  If you cannot attend your appointment, please cancel your appointment with as much notice as possible.  If there is a pattern of failure to present for your appointments, I cannot provide consistent, meaningful, ongoing care for you. It is very important to me that you come in for your care, so we can best assist you with your health care needs.    IMPORTANT:  Please note that it is my standard of practice to NOT participate in prescribing ongoing requested Narcotic Analgesic therapy, and/or participate in the prescribing of other controlled substances.  My nurse and I am happy to assist you with the process of referral for alternative pain management as needed, and other treatment modalities including but not limited to:  Physical Therapy, Physical Medicine and Rehab, Counseling, Chiropractic Care, Orthopedic Care, and non-narcotic  medication management.     In the event that you need to be seen for emergent concerns and I am out of office,  please see one of my colleagues for acute concerns.  You may also present to UC or ER.  I appreciate the opportunity to serve you and look forward to supporting your healthcare needs in the future. Please contact me with any questions or concerns that you may have.    Sincerely,      Agustina Santacruz RN, PA-C               Follow-ups after your visit        Your next 10 appointments already scheduled     May 10, 2018  9:45 AM CDT   (Arrive by 9:30 AM)   SHORT with ÓSCAR Li   [facility] ([facility] )    [address]  [facility] [address]   [phone]              Who to contact     If you have questions or need follow up information about today's clinic visit or your schedule please contact [facility] directly at [phone].  Normal or non-critical lab and imaging results will be communicated to you by MyChart, letter or phone within 4 business days after the clinic has received the results. If you do not hear from us within 7 days, please contact the clinic through MyChart or phone. If you have a critical or abnormal lab result, we will notify you by phone as soon as possible.  Submit refill requests through [employer] Me or call your pharmacy and they will forward the refill request to us. Please allow 3 business days for your refill to be completed.          Additional Information About Your Visit        MyChart Information     [employer] Me gives you secure access to your electronic health record. If you see a primary care provider, you can also send messages to your care team and make appointments. If you have questions, please call your primary care clinic.  If you do not have a primary care provider, please call [phone] and they will assist you.        Care EveryWhere ID     This is your Care EveryWhere ID. This could be used by other  "organizations to access your [place] medical records  [id number]        Your Vitals Were     Pulse Temperature Respirations Height Pulse Oximetry BMI (Body Mass Index)    82 98.8  F (37.1  C) (Tympanic) 18 5' 8\" (1.727 m) 98% 38.01 kg/m2       Blood Pressure from Last 3 Encounters:   05/04/18 132/76   04/21/18 (!) 150/111   12/10/17 171/98    Weight from Last 3 Encounters:   05/04/18 250 lb (113.4 kg)   10/25/17 256 lb (116.1 kg)   09/25/17 240 lb (108.9 kg)              We Performed the Following     1st  Administration  [75987]     GC/Chlamydia by PCR - HI,GH     TDAP VACCINE (ADACEL) [42954.002]     Wet prep        Primary Care Provider Office Phone # Fax #    ÓSCAR Li [phone]       [address] [place] [id number]        Equal Access to Services     [facility]: Hadii aad ku hadasho Soomaali, waaxda luqadaha, qaybta kaalmada adeegyada, waxay idiin hayaan phill carrasco . So [facility] [phone].    ATENCIÓN: Si habla español, tiene a bob disposición servicios gratuitos de asistencia lingüística. [facility] [phone].    We comply with applicable federal civil rights laws and [place] laws. We do not discriminate on the basis of race, color, national origin, age, disability, sex, sexual orientation, or gender identity.            Thank you!     Thank you for choosing [facility]  for your care. Our goal is always to provide you with excellent care. Hearing back from our patients is one way we can continue to improve our services. Please take a few minutes to complete the written survey that you may receive in the mail after your visit with us. Thank you!             Your Updated Medication List - Protect others around you: Learn how to safely use, store and throw away your medicines at www.disposemymeds.org.          This list is accurate as of 5/4/18  1:46 PM.  Always use your most recent med list.                   Brand Name Dispense " Instructions for use Diagnosis    FLUoxetine 40 MG capsule    PROzac    90 capsule    Take 1 capsule (40 mg) by mouth daily    Moderate episode of recurrent major depressive disorder (H), YAJAIRA (generalized anxiety disorder)       gabapentin 300 MG capsule    NEURONTIN    60 capsule    Take 1 capsule (300 mg) by mouth 2 times daily    Panic disorder with agoraphobia, YAJAIRA (generalized anxiety disorder)       nitroFURantoin (macrocrystal-monohydrate) 100 MG capsule    MACROBID    14 capsule    Take 1 capsule (100 mg) by mouth 2 times daily        omeprazole 40 MG capsule    priLOSEC    30 capsule    TAKE 1 CAPSULE BY MOUTH EVERY DAY    Other acute gastritis without hemorrhage       traZODone 100 MG tablet    DESYREL    90 tablet    Take 1 tablet (100 mg) by mouth nightly as needed for sleep    YAJAIRA (generalized anxiety disorder)          No

## 2025-07-02 NOTE — CHART NOTE - NSCHARTNOTEFT_GEN_A_CORE
EMERGENCY : SW consulted by psychiatry as patient requiring inpatient involuntary psychiatric placement. LCSW reviewed patient’s chart. As per chart review patient is a “25 year old male brought to the ED by EMS after making a suicidal threat to a friend over the phone, stating he had a plan to jump off a bridge.” As per ED MD, patient is medically cleared for inpatient psychiatric placement. Involuntary legals completed and present in chart. LCSW met with patient at bedside and introduced self and role to which he verbalized understanding. Patient is A&Ox4 at this time, caregiver declined. Present in room is patient’s father Mike Muñoz PH: [phone]. Patient requests for father to step out of room for social work intervention. Patient’s father stepped out of room at this time. Patient is aware he is recommended for inpatient psychiatry, bed search process explained and patient verbalized understanding. Patient asked that he be provided with updates in which he will then notify his father.     LCSW spoke with Андрей from Central Intake at [facility] who indicates bed for patient. [facility] requesting updated provider documentation. ED MD made aware and updated documentation as per [facility] request. Андрей from [facility] Intake made aware. As per Андрей, awaiting final response from [facility] Hospitalist for inpatient psychiatric bed assignment. Legals and ekg sent to Андрей at [facility]. Handoff provided to social work colleague for continuation of transfer. SW continues to follow.

## 2025-07-02 NOTE — ED ADULT NURSE NOTE - OBJECTIVE STATEMENT
25y M A&Ox4 biba for SI. ems states pt was called by brother for wanting to jump out of second story window. pt states his cat of 17 years recently passed away and was drinking tonight and had " tough time grieving." upon assessment prt is well appearing, No signs of acute distress, breathing spontaneous and unlabored,  moves all extremeties, no signs of harm noted, pt deniying SI/HI at this time. pt denies: SI/HI, HA, fever, chills, CP, SOB, N/V/D, gi/ symptoms.

## 2025-07-02 NOTE — ED PROVIDER NOTE - OBJECTIVE STATEMENT
24 y/o male hx 26 y/o male no pmh presents with suicidal ideation. Per patient he has felt intermittently depressed for the last two years, attempted to overdose on pills 6 months ago but didn't tell anybody. This evening he felt overwhelmed and considered jumping off of a bridge. Per parents, he lost his family cat 3 days ago and has been inconsolable since, using alcohol to cope, including tonight. He appears intoxicated but is AOx3 and has linear thought processes. He did not take any medications tonight, denies chest pain, shortness of breath, abdominal pain, n/v/d. He has never seen a therapist, no history psychiatric treatment.

## 2025-07-02 NOTE — BH PATIENT PROFILE - NSVRISKMENTAL_PSY_ALL_CORE
PROGRESS NOTE    DATE: 3/9/2024    SUBJECTIVE:   Aida Nuno is a 75 year old female who was admitted on 3/8/2024 with left-sided chest pain, persistent shortness of breath.  Initial workup shows elevated BNP, indeterminate V/Q scan.    Left-sided chest pain better with tramadol.    CURRENT MEDICATIONS:  Current Facility-Administered Medications   Medication Dose Route Frequency Provider Last Rate Last Admin    [START ON 3/10/2024] enoxaparin (LOVENOX) injection 40 mg  40 mg Subcutaneous Q24H Rolly Hernandez MD        sodium chloride 0.9 % injection 2 mL  2 mL Intracatheter 2 times per day Yevgeniy Ramirez MD   2 mL at 03/09/24 0748    aspirin (ECOTRIN) enteric coated tablet 81 mg  81 mg Oral Daily Rolly Hernandez MD   81 mg at 03/09/24 0744    budesonide-formoterol (SYMBICORT) 160-4.5 MCG/ACT inhaler 2 puff  2 puff Inhalation BID Rolly Hernandez MD   2 puff at 03/09/24 0825    docusate sodium-sennosides (SENOKOT S) 50-8.6 MG 3 tablet  3 tablet Oral Nightly Rolly Hernandez MD   3 tablet at 03/08/24 2255    levETIRAcetam (KEPPRA) tablet 1,500 mg  1,500 mg Oral BID Rolly Hernandez MD   1,500 mg at 03/09/24 0625    metoPROLOL succinate (TOPROL-XL) ER tablet 25 mg  25 mg Oral Daily Rolly Hernandez MD   25 mg at 03/09/24 0745    levothyroxine (SYNTHROID, LEVOTHROID) tablet 88 mcg  88 mcg Oral Daily Rolly Hernandez MD   88 mcg at 03/09/24 0745    methylPREDNISolone (SOLU-Medrol) injection 60 mg  60 mg Intravenous 3 times per day Geena Gonzalez MD   60 mg at 03/09/24 0546    ipratropium-albuterol (DUONEB) 0.5-2.5 (3) MG/3ML nebulizer solution 3 mL  3 mL Nebulization Q6H Resp Geena Gonzalez MD   3 mL at 03/09/24 0825    doxycycline hyclate (VIBRAMYCIN) capsule 100 mg  100 mg Oral 2 times per day Geena Gonzalez MD   100 mg at 03/09/24 0744         OBJECTIVE:  Visit Vitals  /79 (BP Location: RUE - Right upper extremity, Patient Position: Semi-Falcon's)   Pulse 81   Temp 97.5 °F (36.4 °C)  (Oral)   Resp 18   Ht 5' 4\" (1.626 m)   Wt 77.8 kg (171 lb 9.6 oz)   SpO2 93%   BMI 29.46 kg/m²              I/O last 3 completed shifts:  In: 280 [P.O.:280]  Out: 655 [Urine:655]    PHYSICAL EXAM  General - Alert and awake. No acute distress, room air pulse ox 93%.  Not noticeably short of breath  HEENT -Sclerae anicteric. Lips are moist. Tongue and buccal mucosa moist. Neck is supple. No jugular venous distension.  Cardiovascular - Regular rate and rhythm. Normal S1, S2.  No gallops. No murmurs.  Pulmonary -Adequate respiratory effort. Lungs are clear to auscultation bilaterally. No wheezes. No crackles.  Left-sided chest wall tenderness is less today than on admission  Abdomen - Soft.  No tenderness. No distention. Bowel sounds are normoactive.  Extremities - Warm and well perfused. No cyanosis or edema. Moves all four extremities.  Skin- No rashes or lesions.  Bruises left upper arm apparently from blood pressure cuff  Neuro - Alert and oriented to person, place and time. No focal deficits.    LABS:    Recent Labs   Lab 03/09/24  0648 03/08/24  0953 03/08/24  0946   SODIUM 138  --  139   POTASSIUM 4.7  --  4.3   CHLORIDE 105  --  110   CO2 23  --  22   BUN 26*  --  23*   CREATININE 1.23* 1.30* 1.17*   GLUCOSE 155*  --  111*   ALBUMIN 3.6  --  3.5*   *  --  116*   BILIRUBIN 0.4  --  0.3       Recent Labs   Lab 03/09/24  0648 03/08/24  0946   WBC 21.4* 18.3*   HGB 12.0 12.1   HCT 36.5 36.8    195   MCV 95.5 95.3       ASSESSMENT and PLAN:  1. Left-sided chest wall pain, likely injured during recent visit to orthopedics for hip injection.  Continue p.r.n. tramadol  2. Persistent shortness of breath without obvious explanation.  Cardiology workup including echo pending.  Pulmonary consulted, put her on nebulizers, steroids, antibiotics.  3. Intermediate probability V/Q scan, CT angio done today is negative for PE.  Will reduce Lovenox dosage.    4. Elevated LFTs, will need abdominal imaging.  5.  Sclerotic lesions on chest CT, check abdominal CT for metastatic disease.  Does not have any obvious source for primary cancer            Rolly Hernandez MD   ATTENDING PHYSICIAN   3/9/2024  8:49 AM             Maybe/Moderate

## 2025-07-02 NOTE — ED CDU PROVIDER INITIAL DAY NOTE - PROGRESS NOTE DETAILS
S/o by me to Dr. Orellana at 7AM pending repeat EtOH level, psych consult. -Albina Mello MD (Attending) Attending Leticia Orellana: repeat alcohol tgreater than 100 will continue to monitor. pt comfortable appearing Alcohol level of 130, 2-1/2 hours later patient is awake alert oriented confirms history.  Paged psychiatry. Alcohol level of 150, 2-1/2 hours later patient is awake alert oriented confirms history.  Paged psychiatry. psychiatry at bedside CIWA 0 and pt awaiting bed CIWA 0 and pt awaiting bed  -no hx of etoh withdrawal, drinks about one large beer/day
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
## 2025-07-02 NOTE — ED ADULT NURSE REASSESSMENT NOTE - NS ED NURSE REASSESS COMMENT FT1
pt received asleep this am. Pt in control, visitor at bedside. Pt being evaluated by psychiatrist. Pending evaluation and dispo.
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
## 2025-07-02 NOTE — ED ADULT NURSE NOTE - MOOD
Follow up with DOT needs for letter and imaging if warranted.    Watch BP at home, would like BP goal <130/80.   Depressed

## 2025-07-02 NOTE — BH INPATIENT PSYCHIATRY ASSESSMENT NOTE - RISK ASSESSMENT
Risk factors: recent SA, prior SA, depression, recent loss/bereavement, active substance abuse, not receiving treatment. Protective factors: no h/o psych admissions, good physical health, engaged in work, domiciled, social supports, help-seeking behaviors. Overall, pt is at high acute risk of harm and meets criteria for psychiatric admission for safety and stabilization.

## 2025-07-02 NOTE — ED BEHAVIORAL HEALTH ASSESSMENT NOTE - NSSUICPROTFACT_PSY_ALL_CORE
Supportive social network of family or friends/Positive therapeutic relationships Responsibility to children, family, or others/Supportive social network of family or friends/Engaged in work or school
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
## 2025-07-02 NOTE — ED CDU PROVIDER DISPOSITION NOTE - CLINICAL COURSE
Patient here for suicidal ideation, placed under 9.27 status, transferred to [facility] for inpatient psychiatric care

## 2025-07-02 NOTE — BH PATIENT PROFILE - NSTOBACCONEVERSMOKERY/N_GEN_A
[FreeTextEntry1] : I, Cecilia Ramirez, acted solely as a scribe for Dr. Myron I. Kleiner, MD. on 11/03/2021 .\par \par All medical record entries made by the Scribe were at Dr. Myron I. Kleiner, MD, direction and personally dictated by me on 11/03/2021. I have personally reviewed the chart and agree that the record accurately reflects my personal performance of the history, physical exam, assessment and plan. 
DISPLAY PLAN FREE TEXT
DISPLAY PLAN FREE TEXT
Yes

## 2025-07-02 NOTE — BH INPATIENT PSYCHIATRY ASSESSMENT NOTE - NSBHCHARTREVIEWVS_PSY_A_CORE FT
Vital Signs Last 24 Hrs  T(C): 37 (07-02-25 @ 17:40), Max: 37.1 (07-02-25 @ 15:08)  T(F): 98.6 (07-02-25 @ 17:40), Max: 98.8 (07-02-25 @ 15:08)  HR: 78 (07-02-25 @ 17:40) (73 - 92)  BP: 134/75 (07-02-25 @ 17:40) (113/76 - 148/76)  BP(mean): --  RR: 18 (07-02-25 @ 17:40) (16 - 18)  SpO2: 97% (07-02-25 @ 17:40) (97% - 99%)    Orthostatic VS  07-02-25 @ 18:40  Lying BP: --/-- HR: --  Sitting BP: 132/65 HR: 77  Standing BP: 133/55 HR: 81  Site: --  Mode: --

## 2025-07-02 NOTE — ED CDU PROVIDER DISPOSITION NOTE - ATTENDING CONTRIBUTION TO CARE
Attending MD Amaral:  I personally have seen and examined this patient. I have performed a substantive portion of the visit including all aspects of the medical decision making.  Fellow note reviewed and agree on plan of care and except where noted.              *The above represents an initial assessment/impression. Please refer to progress notes for potential changes in patient clinical course*

## 2025-07-02 NOTE — ED ADULT NURSE REASSESSMENT NOTE - NS ED NURSE REASSESS COMMENT FT1
report given to H RN [facility] on unit Low 3. Pending transfer via EMS. 1:1 maintained for safety, no acute behavioral issues at this time Detail Level: Detailed X Size Of Lesion In Cm (Optional): 0 Introduction Text (Please End With A Colon): The following procedure was deferred: medical laser hair removal

## 2025-07-02 NOTE — BH INPATIENT PSYCHIATRY ASSESSMENT NOTE - NSBHCHARTREVIEWLAB_PSY_A_CORE FT
07-02    143  |  104  |  12  ----------------------------<  95  3.8   |  22  |  0.99    Ca    9.4      02 Jul 2025 02:28

## 2025-07-02 NOTE — ED BEHAVIORAL HEALTH ASSESSMENT NOTE - OTHER PAST PSYCHIATRIC HISTORY (INCLUDE DETAILS REGARDING ONSET, COURSE OF ILLNESS, INPATIENT/OUTPATIENT TREATMENT)
longstanding h/o depression since high school (bullied) with intermittent SI over the years, 1 prior SA last summer via OD on antidepressants, no prior psych admissions or current tx

## 2025-07-02 NOTE — BH INPATIENT PSYCHIATRY ASSESSMENT NOTE - CURRENT MEDICATION
MEDICATIONS  (STANDING):  nicotine -  14 mG/24Hr(s) Patch 1 Patch Transdermal daily    MEDICATIONS  (PRN):  acetaminophen     Tablet .. 650 milliGRAM(s) Oral every 6 hours PRN Temp greater or equal to 38C (100.4F), Mild Pain (1 - 3)  aluminum hydroxide/magnesium hydroxide/simethicone Suspension 30 milliLiter(s) Oral every 6 hours PRN Dyspepsia  diphenhydrAMINE Injectable 50 milliGRAM(s) IntraMuscular once PRN severe agitation due to mood episode  hydrOXYzine hydrochloride 50 milliGRAM(s) Oral every 4 hours PRN Anxiety  LORazepam     Tablet 2 milliGRAM(s) Oral every 2 hours PRN for CIWA score 8 to 12  LORazepam   Injectable 2 milliGRAM(s) IntraMuscular every 2 hours PRN for CIWA score 13 or higher  magnesium hydroxide Suspension 30 milliLiter(s) Oral daily PRN Constipation  nicotine  Polacrilex Gum 2 milliGRAM(s) Oral every 3 hours PRN breakthrough cravings  ondansetron    Tablet 4 milliGRAM(s) Oral three times a day PRN nausea  traZODone 50 milliGRAM(s) Oral at bedtime PRN insomnia

## 2025-07-02 NOTE — BH INPATIENT PSYCHIATRY ASSESSMENT NOTE - NSBHMETABOLIC_PSY_ALL_CORE_FT
BMI: BMI (kg/m2): 19.6 (07-02-25 @ 18:40)  HbA1c:   Glucose:   BP: --Vital Signs Last 24 Hrs  T(C): 37 (07-02-25 @ 17:40), Max: 37.1 (07-02-25 @ 15:08)  T(F): 98.6 (07-02-25 @ 17:40), Max: 98.8 (07-02-25 @ 15:08)  HR: 78 (07-02-25 @ 17:40) (73 - 92)  BP: 134/75 (07-02-25 @ 17:40) (113/76 - 148/76)  BP(mean): --  RR: 18 (07-02-25 @ 17:40) (16 - 18)  SpO2: 97% (07-02-25 @ 17:40) (97% - 99%)    Orthostatic VS  07-02-25 @ 18:40  Lying BP: --/-- HR: --  Sitting BP: 132/65 HR: 77  Standing BP: 133/55 HR: 81  Site: --  Mode: --    Lipid Panel:

## 2025-07-02 NOTE — ED ADULT NURSE NOTE - NSFALLUNIVINTERV_ED_ALL_ED
Bed/Stretcher in lowest position, wheels locked, appropriate side rails in place/Call bell, personal items and telephone in reach/Instruct patient to call for assistance before getting out of bed/chair/stretcher/Non-slip footwear applied when patient is off stretcher/[place] to call system/Physically safe environment - no spills, clutter or unnecessary equipment/Purposeful proactive rounding/Room/bathroom lighting operational, light cord in reach
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
## 2025-07-02 NOTE — ED BEHAVIORAL HEALTH ASSESSMENT NOTE - DESCRIPTION
stable relationship. Lives with both parents and younger brother. Works as a king. T(C): 37.1 (07-02-25 @ 15:08), Max: 37.1 (07-02-25 @ 15:08)  HR: 81 (07-02-25 @ 15:08) (73 - 92)  BP: 134/63 (07-02-25 @ 15:08) (113/76 - 148/76)  RR: 18 (07-02-25 @ 15:08) (16 - 18)  SpO2: 97% (07-02-25 @ 15:08) (97% - 99%)  Wt(kg): -- denies
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
## 2025-07-02 NOTE — ED PROVIDER NOTE - CLINICAL SUMMARY MEDICAL DECISION MAKING FREE TEXT BOX
26 y/o male 26 y/o male no pmh presents with suicidal ideation. He is hemodynamically stable, afebrile, on exam he is comfortable appearing with even, unlabored respirations. He is tearful, appears intoxicated but is AOx3, moist mucous membranes, PERRL. Will get clearance labs, observe until sobriety, consult psych.

## 2025-07-02 NOTE — ED CDU PROVIDER INITIAL DAY NOTE - CLINICAL SUMMARY MEDICAL DECISION MAKING FREE TEXT BOX
25M denies PMH, no prior psychiatric history, presenting w/ suicidal ideation and worsening depressive symptoms i/s/o recent death of cat. Appears well, AOx3, not in acute distress. Lungs CTABL, normal heart sounds, abdomen soft and NT/ND. Labs w/ elevated EtOH to 234, other labs reviewed and overall unremarkable, no significant leukocytosis or electrolyte abnormalities. Placed in observation status for continued monitoring, repeat EtOH level, psychiatry consult. -Albina Mello MD (Attending)

## 2025-07-03 PROCEDURE — 99232 SBSQ HOSP IP/OBS MODERATE 35: CPT

## 2025-07-03 RX ORDER — ESCITALOPRAM OXALATE 20 MG/1
5 TABLET ORAL DAILY
Refills: 0 | Status: DISCONTINUED | OUTPATIENT
Start: 2025-07-03 | End: 2025-07-08

## 2025-07-03 RX ORDER — MELATONIN 5 MG
5 TABLET ORAL AT BEDTIME
Refills: 0 | Status: DISCONTINUED | OUTPATIENT
Start: 2025-07-03 | End: 2025-07-08

## 2025-07-03 RX ADMIN — Medication 5 MILLIGRAM(S): at 23:32

## 2025-07-03 RX ADMIN — NICOTINE POLACRILEX 1 PATCH: 4 GUM, CHEWING ORAL at 08:35

## 2025-07-03 RX ADMIN — NICOTINE POLACRILEX 1 PATCH: 4 GUM, CHEWING ORAL at 18:01

## 2025-07-03 NOTE — PSYCHIATRIC REHAB INITIAL EVALUATION - NSBHPRRECOMMEND_PSY_ALL_CORE
Writer met with the patient in order to introduce patient to Psychiatric Rehabilitation staff, department function, and establish Psychiatric rehabilitation goal. Patient was able to be fully oriented to group programming and was encouraged to attend groups offered. Questions and concerns regarding treatment and Psychiatric Rehabilitation services were addressed.     Patient was admitted to [place] [facility] [address] 02-July-2025 in the context of suicidal ideation and depression. Per chart, patient has a PPHx of mood disorder, alcohol and substance use disorder, and MDD with no known prior inpatient hospital admissions. Patient did have one prior suicide attempt by overdose during the Summer of 2024. Patient has no known PMHx.     Patient’s insight into symptoms is good. Patient was able to develop a rapport and expresses interest in receiving treatment. A collaborative Psychiatric Rehabilitation goal was established.     Psychiatric Rehabilitation Staff will continue to engage patient daily in order to develop rapport, provide support, and assist patient in demonstrating progress toward Psychiatric

## 2025-07-03 NOTE — PSYCHIATRIC REHAB INITIAL EVALUATION - VISION (WITH CORRECTIVE LENSES IF THE PATIENT USUALLY WEARS THEM):
Detail Level: Detailed Quality 47: Advance Care Plan: Advance Care Planning discussed and documented in the medical record; patient did not wish or was not able to name a surrogate decision maker or provide an advance care plan. Quality 226: Preventive Care And Screening: Tobacco Use: Screening And Cessation Intervention: Patient screened for tobacco use and is an ex/non-smoker Normal vision: sees adequately in most situations; can see medication labels, newsprint

## 2025-07-03 NOTE — BH SOCIAL WORK INITIAL PSYCHOSOCIAL EVALUATION - NSCMSPTSTRENGTHS_PSY_ALL_CORE
Expressive of emotions/Future/goal oriented/Physically healthy/Strong support system/Successful coping history/Supportive family

## 2025-07-03 NOTE — PSYCHIATRIC REHAB INITIAL EVALUATION - NSBHALCSUBTREAT_PSY_ALL_CORE
patient has a Hx of seeing a behavioral health counselor back in college but had a bad experience, so did not seek further treatment after one session. Patient expresses interest in virtual therapy sessions post-hospitalization/None

## 2025-07-03 NOTE — PSYCHIATRIC REHAB INITIAL EVALUATION - NSBHALCSUBCHOICE_PSY_ALL_CORE
Patient states that he vapes, per chart he drinks about 4-5 high alcoholic content beverages a day.

## 2025-07-04 PROCEDURE — 99232 SBSQ HOSP IP/OBS MODERATE 35: CPT

## 2025-07-04 RX ADMIN — NICOTINE POLACRILEX 1 PATCH: 4 GUM, CHEWING ORAL at 09:50

## 2025-07-04 RX ADMIN — NICOTINE POLACRILEX 2 MILLIGRAM(S): 4 GUM, CHEWING ORAL at 08:23

## 2025-07-04 RX ADMIN — NICOTINE POLACRILEX 1 PATCH: 4 GUM, CHEWING ORAL at 07:05

## 2025-07-04 RX ADMIN — ESCITALOPRAM OXALATE 5 MILLIGRAM(S): 20 TABLET ORAL at 08:20

## 2025-07-05 PROCEDURE — 99232 SBSQ HOSP IP/OBS MODERATE 35: CPT

## 2025-07-05 RX ORDER — MELATONIN 5 MG
5 TABLET ORAL ONCE
Refills: 0 | Status: COMPLETED | OUTPATIENT
Start: 2025-07-05 | End: 2025-07-05

## 2025-07-05 RX ADMIN — Medication 5 MILLIGRAM(S): at 22:25

## 2025-07-05 RX ADMIN — ESCITALOPRAM OXALATE 5 MILLIGRAM(S): 20 TABLET ORAL at 08:11

## 2025-07-05 RX ADMIN — Medication 5 MILLIGRAM(S): at 00:20

## 2025-07-06 PROCEDURE — 99232 SBSQ HOSP IP/OBS MODERATE 35: CPT

## 2025-07-06 RX ADMIN — ESCITALOPRAM OXALATE 5 MILLIGRAM(S): 20 TABLET ORAL at 08:33

## 2025-07-06 RX ADMIN — NICOTINE POLACRILEX 2 MILLIGRAM(S): 4 GUM, CHEWING ORAL at 14:44

## 2025-07-06 RX ADMIN — NICOTINE POLACRILEX 2 MILLIGRAM(S): 4 GUM, CHEWING ORAL at 11:26

## 2025-07-06 RX ADMIN — Medication 5 MILLIGRAM(S): at 21:58

## 2025-07-07 PROCEDURE — 90853 GROUP PSYCHOTHERAPY: CPT

## 2025-07-07 PROCEDURE — 99232 SBSQ HOSP IP/OBS MODERATE 35: CPT

## 2025-07-07 RX ORDER — NICOTINE POLACRILEX 4 MG/1
1 GUM, CHEWING ORAL
Qty: 3 | Refills: 0
Start: 2025-07-07 | End: 2025-08-05

## 2025-07-07 RX ORDER — MELATONIN 5 MG
1 TABLET ORAL
Qty: 30 | Refills: 0
Start: 2025-07-07 | End: 2025-08-05

## 2025-07-07 RX ORDER — ESCITALOPRAM OXALATE 20 MG/1
1 TABLET ORAL
Qty: 30 | Refills: 0
Start: 2025-07-07 | End: 2025-08-05

## 2025-07-07 RX ADMIN — NICOTINE POLACRILEX 2 MILLIGRAM(S): 4 GUM, CHEWING ORAL at 10:59

## 2025-07-07 RX ADMIN — NICOTINE POLACRILEX 2 MILLIGRAM(S): 4 GUM, CHEWING ORAL at 21:03

## 2025-07-07 RX ADMIN — ESCITALOPRAM OXALATE 5 MILLIGRAM(S): 20 TABLET ORAL at 08:59

## 2025-07-07 RX ADMIN — Medication 5 MILLIGRAM(S): at 22:10

## 2025-07-07 NOTE — BH INPATIENT PSYCHIATRY DISCHARGE NOTE - NSDCCPCAREPLAN_GEN_ALL_CORE_FT
PRINCIPAL DISCHARGE DIAGNOSIS  Diagnosis: MDD (major depressive disorder)  Assessment and Plan of Treatment:       SECONDARY DISCHARGE DIAGNOSES  Diagnosis: Alcohol intoxication without use disorder  Assessment and Plan of Treatment:
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
## 2025-07-07 NOTE — BH INPATIENT PSYCHIATRY PROGRESS NOTE - NSBHFUPINTERVALCCFT_PSY_A_CORE
reported feeling "fine". 
reported feeling "sad". 
reported feeling "fine".

## 2025-07-07 NOTE — BH INPATIENT PSYCHIATRY DISCHARGE NOTE - NSBHFUPINTERVALCCFT_PSY_A_CORE
reported feeling "fine".  Patient seen, chart reviewed, case discussed with team.  patient seen for follow up of suicidal ideation in the setting of on depression and alcohol use.

## 2025-07-07 NOTE — BH INPATIENT PSYCHIATRY PROGRESS NOTE - NSBHASSESSSUMMFT_PSY_ALL_CORE
25 year old male with Alcohol Use Disorder, uncomplicated with no known hx of complex withdrawals/seizures, daily vapes / smoked 1ppd cigarettes, with one prior self-aborted suicide attempt summer of '24 (ingested prescribed antidepressants with subsequent self-induced vomiting), prior remote antidepressant use (unclear if a full trials), presenting after taking substantial step towards suicide while intoxicated on alcohol in the context of low mood x 2 years which worsened in the last month or so with loss of grandparent and most recently beloved pet he has had since childhood, as well as recalling prior relationship hurt. Patient is at an acute risk to self and [facility]    on assessment, patient appears to be minimizing depression symptoms, currently denies SI/I/P, in agreement to start Lexapro for anxiety/ depression sx.   -start Lexapro 5mg daily    - admit to L3 on a 9.27  - regular diet/activity/ambulation  - symptoms triggered CIWA; while no history of withdrawals, patient has been consuming more than usual  - nicotine replacement (patch and PRN gum)  - no medical issues, not on any medical medications  - PRN psychiatric medications for now; deferring start of a standing agent to inpt team    - would benefit from also going to addiction services post discharge (discussed with Pt)
25 year old male with Alcohol Use Disorder, uncomplicated with no known hx of complex withdrawals/seizures, daily vapes / smoked 1ppd cigarettes, with one prior self-aborted suicide attempt summer of '24 (ingested prescribed antidepressants with subsequent self-induced vomiting), prior remote antidepressant use (unclear if a full trials), presenting after taking substantial step towards suicide while intoxicated on alcohol in the context of low mood x 2 years which worsened in the last month or so with loss of grandparent and most recently beloved pet he has had since childhood, as well as recalling prior relationship hurt. Patient is at an acute risk to self and [facility]:    on assessment, patient appears to be minimizing depression symptoms, currently denies SI/I/P, in agreement to start Lexapro for anxiety/ depression sx.   -start Lexapro 5mg daily    7/7: Patient has been in good behavioral control on the unit. He now denies any SI and reports finding lexapro beneficial. Parents called to share that they feel patient is doing better as well. Coordinating with SW to secure aftercare. Tentative plan for d/c tomorrow.    - admit to L3 on a 9.27  - regular diet/activity/ambulation  - symptoms triggered CIWA; while no history of withdrawals, patient has been consuming more than usual  - nicotine replacement (patch and PRN gum)  - no medical issues, not on any medical medications  - PRN psychiatric medications for now; deferring start of a standing agent to inpt team    - would benefit from also going to addiction services post discharge (discussed with Pt)
25 year old male with Alcohol Use Disorder, uncomplicated with no known hx of complex withdrawals/seizures, daily vapes / smoked 1ppd cigarettes, with one prior self-aborted suicide attempt summer of '24 (ingested prescribed antidepressants with subsequent self-induced vomiting), prior remote antidepressant use (unclear if a full trials), presenting after taking substantial step towards suicide while intoxicated on alcohol in the context of low mood x 2 years which worsened in the last month or so with loss of grandparent and most recently beloved pet he has had since childhood, as well as recalling prior relationship hurt. Patient is at an acute risk to self and [facility]    on assessment, patient appears to be minimizing depression symptoms, currently denies SI/I/P, in agreement to start Lexapro for anxiety/ depression sx.   -start Lexapro 5mg daily    - admit to L3 on a 9.27  - regular diet/activity/ambulation  - symptoms triggered CIWA; while no history of withdrawals, patient has been consuming more than usual  - nicotine replacement (patch and PRN gum)  - no medical issues, not on any medical medications  - PRN psychiatric medications for now; deferring start of a standing agent to inpt team    - would benefit from also going to addiction services post discharge (discussed with Pt)
25 year old male with Alcohol Use Disorder, uncomplicated with no known hx of complex withdrawals/seizures, daily vapes / smoked 1ppd cigarettes, with one prior self-aborted suicide attempt summer of '24 (ingested prescribed antidepressants with subsequent self-induced vomiting), prior remote antidepressant use (unclear if a full trials), presenting after taking substantial step towards suicide while intoxicated on alcohol in the context of low mood x 2 years which worsened in the last month or so with loss of grandparent and most recently beloved pet he has had since childhood, as well as recalling prior relationship hurt. Patient is at an acute risk to self [facility]    on assessment, patient appears to be minimizing depression symptoms, currently denies SI/I/P, in agreement to start Lexapro for anxiety/ depression sx.   -start Lexapro 5mg daily    - admit to L3 on a 9.27  - regular diet/activity/ambulation  - symptoms triggered CIWA; while no history of withdrawals, patient has been consuming more than usual  - nicotine replacement (patch and PRN gum)  - no medical issues, not on any medical medications  - PRN psychiatric medications for now; deferring start of a standing agent to inpt team    - would benefit from also going to addiction services post discharge (discussed with Pt)
25 year old male with Alcohol Use Disorder, uncomplicated with no known hx of complex withdrawals/seizures, daily vapes / smoked 1ppd cigarettes, with one prior self-aborted suicide attempt summer of '24 (ingested prescribed antidepressants with subsequent self-induced vomiting), prior remote antidepressant use (unclear if a full trials), presenting after taking substantial step towards suicide while intoxicated on alcohol in the context of low mood x 2 years which worsened in the last month or so with loss of grandparent and most recently beloved pet he has had since childhood, as well as recalling prior relationship hurt. Patient is at an acute risk to self and [facility]    on assessment, patient appears to be minimizing depression symptoms, currently denies SI/I/P, in agreement to start Lexapro for anxiety/ depression sx.   -start Lexapro 5mg daily    - admit to L3 on a 9.27  - regular diet/activity/ambulation  - symptoms triggered CIWA; while no history of withdrawals, patient has been consuming more than usual  - nicotine replacement (patch and PRN gum)  - no medical issues, not on any medical medications  - PRN psychiatric medications for now; deferring start of a standing agent to inpt team    - would benefit from also going to addiction services post discharge (discussed with Pt)

## 2025-07-07 NOTE — BH INPATIENT PSYCHIATRY PROGRESS NOTE - NSBHMETABOLIC_PSY_ALL_CORE_FT
BMI: BMI (kg/m2): 19.6 (07-02-25 @ 18:40)  HbA1c:   Glucose:   BP: --Vital Signs Last 24 Hrs  T(C): 36.7 (07-06-25 @ 08:20), Max: 36.7 (07-05-25 @ 18:33)  T(F): 98.1 (07-06-25 @ 08:20), Max: 98.1 (07-05-25 @ 18:33)  HR: --  BP: --  BP(mean): --  RR: 18 (07-06-25 @ 08:20) (18 - 18)  SpO2: --    Orthostatic VS  07-06-25 @ 08:20  Lying BP: --/-- HR: --  Sitting BP: 103/61 HR: 91  Standing BP: 108/62 HR: 82  Site: --  Mode: --  Orthostatic VS  07-05-25 @ 18:33  Lying BP: --/-- HR: --  Sitting BP: 124/67 HR: 67  Standing BP: 124/71 HR: 66  Site: --  Mode: --  Orthostatic VS  07-05-25 @ 08:16  Lying BP: --/-- HR: --  Sitting BP: 113/64 HR: 95  Standing BP: 130/66 HR: 88  Site: --  Mode: --  Orthostatic VS  07-04-25 @ 20:20  Lying BP: --/-- HR: --  Sitting BP: 124/66 HR: 78  Standing BP: --/-- HR: --  Site: --  Mode: --    Lipid Panel: 
BMI: BMI (kg/m2): 19.6 (07-02-25 @ 18:40)  HbA1c:   Glucose:   BP: --Vital Signs Last 24 Hrs  T(C): 36.7 (07-07-25 @ 08:38), Max: 36.7 (07-07-25 @ 08:38)  T(F): 98.1 (07-07-25 @ 08:38), Max: 98.1 (07-07-25 @ 08:38)  HR: --  BP: --  BP(mean): --  RR: --  SpO2: --    Orthostatic VS  07-07-25 @ 08:38  Lying BP: --/-- HR: --  Sitting BP: 122/64 HR: 105  Standing BP: 118/54 HR: 90  Site: --  Mode: --  Orthostatic VS  07-06-25 @ 08:20  Lying BP: --/-- HR: --  Sitting BP: 103/61 HR: 91  Standing BP: 108/62 HR: 82  Site: --  Mode: --  Orthostatic VS  07-05-25 @ 18:33  Lying BP: --/-- HR: --  Sitting BP: 124/67 HR: 67  Standing BP: 124/71 HR: 66  Site: --  Mode: --    Lipid Panel: 
BMI: BMI (kg/m2): 19.6 (07-02-25 @ 18:40)  HbA1c:   Glucose:   BP: --Vital Signs Last 24 Hrs  T(C): 37 (07-04-25 @ 08:00), Max: 37 (07-04-25 @ 08:00)  T(F): 98.6 (07-04-25 @ 08:00), Max: 98.6 (07-04-25 @ 08:00)  HR: --  BP: --  BP(mean): --  RR: --  SpO2: --    Orthostatic VS  07-04-25 @ 08:00  Lying BP: --/-- HR: --  Sitting BP: 112/62 HR: 83  Standing BP: 118/58 HR: 70  Site: --  Mode: --  Orthostatic VS  07-03-25 @ 20:09  Lying BP: --/-- HR: --  Sitting BP: 125/66 HR: 99  Standing BP: 130/60 HR: 97  Site: --  Mode: --  Orthostatic VS  07-03-25 @ 08:19  Lying BP: --/-- HR: --  Sitting BP: 101/31 HR: 87  Standing BP: 104/60 HR: 101  Site: --  Mode: --  Orthostatic VS  07-02-25 @ 18:40  Lying BP: --/-- HR: --  Sitting BP: 132/65 HR: 77  Standing BP: 133/55 HR: 81  Site: --  Mode: --    Lipid Panel: 
BMI: BMI (kg/m2): 19.6 (07-02-25 @ 18:40)  HbA1c:   Glucose:   BP: --Vital Signs Last 24 Hrs  T(C): 37 (07-02-25 @ 17:40), Max: 37.1 (07-02-25 @ 15:08)  T(F): 98.6 (07-02-25 @ 17:40), Max: 98.8 (07-02-25 @ 15:08)  HR: 78 (07-02-25 @ 17:40) (78 - 81)  BP: 134/75 (07-02-25 @ 17:40) (134/63 - 134/75)  BP(mean): --  RR: 18 (07-02-25 @ 17:40) (18 - 18)  SpO2: 97% (07-02-25 @ 17:40) (97% - 97%)    Orthostatic VS  07-03-25 @ 08:19  Lying BP: --/-- HR: --  Sitting BP: 101/31 HR: 87  Standing BP: 104/60 HR: 101  Site: --  Mode: --  Orthostatic VS  07-02-25 @ 18:40  Lying BP: --/-- HR: --  Sitting BP: 132/65 HR: 77  Standing BP: 133/55 HR: 81  Site: --  Mode: --    Lipid Panel: 
BMI: BMI (kg/m2): 19.6 (07-02-25 @ 18:40)  HbA1c:   Glucose:   BP: --Vital Signs Last 24 Hrs  T(C): 36.7 (07-05-25 @ 08:16), Max: 36.9 (07-04-25 @ 20:20)  T(F): 98.1 (07-05-25 @ 08:16), Max: 98.4 (07-04-25 @ 20:20)  HR: --  BP: --  BP(mean): --  RR: --  SpO2: --    Orthostatic VS  07-05-25 @ 08:16  Lying BP: --/-- HR: --  Sitting BP: 113/64 HR: 95  Standing BP: 130/66 HR: 88  Site: --  Mode: --  Orthostatic VS  07-04-25 @ 20:20  Lying BP: --/-- HR: --  Sitting BP: 124/66 HR: 78  Standing BP: --/-- HR: --  Site: --  Mode: --  Orthostatic VS  07-04-25 @ 08:00  Lying BP: --/-- HR: --  Sitting BP: 112/62 HR: 83  Standing BP: 118/58 HR: 70  Site: --  Mode: --  Orthostatic VS  07-03-25 @ 20:09  Lying BP: --/-- HR: --  Sitting BP: 125/66 HR: 99  Standing BP: 130/60 HR: 97  Site: --  Mode: --    Lipid Panel:

## 2025-07-07 NOTE — BH INPATIENT PSYCHIATRY DISCHARGE NOTE - NSBHSUICIDESTATUS_PSY_ALL_CORE
attenuated Compared to admission, patient is greatly improved and is not an acute danger to self or others.  However, given certain historical facts, personal attributes and experiences, patient is still at chronic risk for harm to self and others.  Where possible, risk factors present at/during  hospitalization have been addressed as follows:   Henry is at an unmodifiable chrocnially elevated risk of suicide because of his history of major depressive dsorder diagnosis and an alcohol use disorder diagnosis.  Kirk he has a history of multiple suicide attempts that place him at similar unmodifiable chronically elevated risk of suicide.  He has a history fo abuse/trauma which similarly contributes.  Male gender also contributes.    At the time of amdisison he presented with certain symptons that consisttuted modifiable acute risk factors for suicide.  These included suicidal ideation/attempt, depressed mood, despair, severe anxiety and impulslvity.   He has copme to consistently deny si/hi intents or plans on the unit and there is no evidence of them. He reports and evidences an improved mood.  There is no despair or severe anxiety at the time of discharge and there is no global insomnia.  He has manifested good impulse control and affective regulation on the unit.    Acute risk has been further ameliorated by referral to treatment as he was not in treatment at the time of admission.      Activated events included losses of a pet and recent relationship losses.  He was also intoxicated at the time of presentation.  He is no longer intoxicated and reports improved insight into his alcohol use and a desire to maintain sobriety.      He denies access to firearms and has completed asafety plan further minimizing risk.      Pt was provided with pharmacotherapy and psychotherapy throughout this hospitalization and upon discharge was instructed to practice the provided safe coping mechanisms and to take prescribed medication only as directed.   Pt was informed of the benefits and risks of current psychiatric medication including but not limited to EPS, TD, NMS, and metabolic syndrome. Pt advised to call 911 if sx worsen, the discussed life-threatening side effects occur, SI/HI develop, or pt becomes acutely dangerous to self or others. Pt voiced understanding and agreement.

## 2025-07-07 NOTE — BH INPATIENT PSYCHIATRY PROGRESS NOTE - NSTXPROBTOBACO_PSY_ALL_CORE
TOBACCO/NICOTINE USE

## 2025-07-07 NOTE — BH INPATIENT PSYCHIATRY PROGRESS NOTE - NSTXSUICIDGOAL_PSY_ALL_CORE
Will develop a suicide prevention/safety plan
Will express feelings associated with suicidal ideation
Will develop a suicide prevention/safety plan

## 2025-07-07 NOTE — BH INPATIENT PSYCHIATRY PROGRESS NOTE - NSBHFUPINTERVALHXFT_PSY_A_CORE
Patient was seen for depression.    Chart, and medications reviewed.  Patient is discussed with nursing team. no interval events.  Appetite and sleep maintained.  Compliant with standing medications, no SE reported.  Tolerating Lexapro.   No behavioral concerns, no prns for aggression.  Patient is seen in dayroom, he is calm, and cooperative upon approach.  He reports that his mood continues to improve.   He denies any current SI/HI, no plan or intent on the unit.  No overt psychotic trend. Patient denies AVH, paranoia, or delusions. No pain or discomfort. No w/d sx. Inquired about dc. Continue to monitor and provide therapeutic support. 
Chart reviewed and case discussed with interdisciplinary team. No acute events over the weekend. On my interview with patient today, he states that he is doing well and sleeping well here. Denies any alcohol cravings. Feels lexapro has been helpful. Adamantly denies any SI. Hopes to discharge home soon. Patient's mom called unit to share that she feels patient is doing better and feels he can return home.
Patient was seen for depression.    Chart, and medications reviewed.  Patient is discussed with nursing team. no interval events.  Appetite and sleep maintained.  Compliant with standing medications, no SE reported. No behavioral concerns, no prns for aggression, mostly stays in his rom.  Pt is seen in his room, he is alseep but wakes up momentarily for interview.  he is calm, and cooperative upon approach, somewhat superficially engaged.  He reports that his mood continues to improve.   He denies any current SI/HI, no plan or intent on the unit.  No overt psychotic trend. Patient denies AVH, paranoia, or delusions. No pain or discomfort. No w/d sx. Continue to monitor and provide therapeutic support. 
f/up depression, care discussed with treatment team, VSS. no issues overnight. CIWA 0,  Patient reported that he wanted to end his life, reported that he was intoxicated. Patient reported drinking to cope with stress. Reported that he stopped himself from jumping from a 4th floor parking area. Patient reported experiencing 2 recent losses, was finding it difficult to eat.  Patient reports that he is not feeling sad at this moment, does not feel hopeless. Reports OD last Summer in the context of stressors, did not seek help. Patient in agreement to start Lexapro, discussed indications, SE and black box warning. Denied parish sx in the past. 
Patient was seen for depression.    Chart, and medications reviewed.  Patient is discussed with nursing team. no interval events.  Appetite and sleep maintained.  Compliant with standing medications, no SE reported.  Tolerated Lexapro.   No behavioral concerns, no prns for aggression.     Patient is seen on the unit, he is calm, and cooperative upon approach.  He reports that his mood is slightly better.   He denies any current SI/HI, no plan or intent on the unit.   No overt psychotic trend. Patient denies AVH, paranoia, or delusions. No pain or discomfort. No w/d sx. Continue to monitor and provide therapeutic support.

## 2025-07-07 NOTE — BH INPATIENT PSYCHIATRY PROGRESS NOTE - NSBHATTESTTYPEVISIT_PSY_A_CORE
TALIA without on-site Attending supervision
Attending Only
TALIA without on-site Attending supervision

## 2025-07-07 NOTE — BH INPATIENT PSYCHIATRY PROGRESS NOTE - PRN MEDS
MEDICATIONS  (PRN):  acetaminophen     Tablet .. 650 milliGRAM(s) Oral every 6 hours PRN Temp greater or equal to 38C (100.4F), Mild Pain (1 - 3)  aluminum hydroxide/magnesium hydroxide/simethicone Suspension 30 milliLiter(s) Oral every 6 hours PRN Dyspepsia  diphenhydrAMINE Injectable 50 milliGRAM(s) IntraMuscular once PRN severe agitation due to mood episode  hydrOXYzine hydrochloride 50 milliGRAM(s) Oral every 4 hours PRN Anxiety  LORazepam     Tablet 2 milliGRAM(s) Oral every 2 hours PRN for CIWA score 8 to 12  LORazepam   Injectable 2 milliGRAM(s) IntraMuscular every 2 hours PRN for CIWA score 13 or higher  magnesium hydroxide Suspension 30 milliLiter(s) Oral daily PRN Constipation  melatonin. 5 milliGRAM(s) Oral at bedtime PRN Insomnia  nicotine  Polacrilex Gum 2 milliGRAM(s) Oral every 3 hours PRN breakthrough cravings  ondansetron    Tablet 4 milliGRAM(s) Oral three times a day PRN nausea  traZODone 50 milliGRAM(s) Oral at bedtime PRN insomnia  
MEDICATIONS  (PRN):  acetaminophen     Tablet .. 650 milliGRAM(s) Oral every 6 hours PRN Temp greater or equal to 38C (100.4F), Mild Pain (1 - 3)  aluminum hydroxide/magnesium hydroxide/simethicone Suspension 30 milliLiter(s) Oral every 6 hours PRN Dyspepsia  diphenhydrAMINE Injectable 50 milliGRAM(s) IntraMuscular once PRN severe agitation due to mood episode  hydrOXYzine hydrochloride 50 milliGRAM(s) Oral every 4 hours PRN Anxiety  LORazepam     Tablet 2 milliGRAM(s) Oral every 2 hours PRN for CIWA score 8 to 12  LORazepam   Injectable 2 milliGRAM(s) IntraMuscular every 2 hours PRN for CIWA score 13 or higher  magnesium hydroxide Suspension 30 milliLiter(s) Oral daily PRN Constipation  melatonin. 5 milliGRAM(s) Oral at bedtime PRN Insomnia  nicotine  Polacrilex Gum 2 milliGRAM(s) Oral every 3 hours PRN breakthrough cravings  ondansetron    Tablet 4 milliGRAM(s) Oral three times a day PRN nausea  traZODone 50 milliGRAM(s) Oral at bedtime PRN insomnia  
MEDICATIONS  (PRN):  acetaminophen     Tablet .. 650 milliGRAM(s) Oral every 6 hours PRN Temp greater or equal to 38C (100.4F), Mild Pain (1 - 3)  aluminum hydroxide/magnesium hydroxide/simethicone Suspension 30 milliLiter(s) Oral every 6 hours PRN Dyspepsia  diphenhydrAMINE Injectable 50 milliGRAM(s) IntraMuscular once PRN severe agitation due to mood episode  hydrOXYzine hydrochloride 50 milliGRAM(s) Oral every 4 hours PRN Anxiety  LORazepam     Tablet 2 milliGRAM(s) Oral every 2 hours PRN for CIWA score 8 to 12  LORazepam   Injectable 2 milliGRAM(s) IntraMuscular every 2 hours PRN for CIWA score 13 or higher  magnesium hydroxide Suspension 30 milliLiter(s) Oral daily PRN Constipation  nicotine  Polacrilex Gum 2 milliGRAM(s) Oral every 3 hours PRN breakthrough cravings  ondansetron    Tablet 4 milliGRAM(s) Oral three times a day PRN nausea  traZODone 50 milliGRAM(s) Oral at bedtime PRN insomnia  
MEDICATIONS  (PRN):  acetaminophen     Tablet .. 650 milliGRAM(s) Oral every 6 hours PRN Temp greater or equal to 38C (100.4F), Mild Pain (1 - 3)  aluminum hydroxide/magnesium hydroxide/simethicone Suspension 30 milliLiter(s) Oral every 6 hours PRN Dyspepsia  diphenhydrAMINE Injectable 50 milliGRAM(s) IntraMuscular once PRN severe agitation due to mood episode  hydrOXYzine hydrochloride 50 milliGRAM(s) Oral every 4 hours PRN Anxiety  LORazepam     Tablet 2 milliGRAM(s) Oral every 2 hours PRN for CIWA score 8 to 12  LORazepam   Injectable 2 milliGRAM(s) IntraMuscular every 2 hours PRN for CIWA score 13 or higher  magnesium hydroxide Suspension 30 milliLiter(s) Oral daily PRN Constipation  melatonin. 5 milliGRAM(s) Oral at bedtime PRN Insomnia  nicotine  Polacrilex Gum 2 milliGRAM(s) Oral every 3 hours PRN breakthrough cravings  ondansetron    Tablet 4 milliGRAM(s) Oral three times a day PRN nausea  traZODone 50 milliGRAM(s) Oral at bedtime PRN insomnia  
MEDICATIONS  (PRN):  acetaminophen     Tablet .. 650 milliGRAM(s) Oral every 6 hours PRN Temp greater or equal to 38C (100.4F), Mild Pain (1 - 3)  aluminum hydroxide/magnesium hydroxide/simethicone Suspension 30 milliLiter(s) Oral every 6 hours PRN Dyspepsia  diphenhydrAMINE Injectable 50 milliGRAM(s) IntraMuscular once PRN severe agitation due to mood episode  hydrOXYzine hydrochloride 50 milliGRAM(s) Oral every 4 hours PRN Anxiety  LORazepam     Tablet 2 milliGRAM(s) Oral every 2 hours PRN for CIWA score 8 to 12  LORazepam   Injectable 2 milliGRAM(s) IntraMuscular every 2 hours PRN for CIWA score 13 or higher  magnesium hydroxide Suspension 30 milliLiter(s) Oral daily PRN Constipation  melatonin. 5 milliGRAM(s) Oral at bedtime PRN Insomnia  nicotine  Polacrilex Gum 2 milliGRAM(s) Oral every 3 hours PRN breakthrough cravings  ondansetron    Tablet 4 milliGRAM(s) Oral three times a day PRN nausea  traZODone 50 milliGRAM(s) Oral at bedtime PRN insomnia

## 2025-07-07 NOTE — BH PSYCHOLOGY - GROUP THERAPY NOTE - NSBHPSYCHOLPARTICIPCOMMENT_PSY_A_CORE FT
Patient arrived to the session on time; patient appeared attentive and interested in the group discussion.  Although the patient did not contribute spontaneously during the group session, patient was able to read from the worksheet when prompted. Patient was able to follow along when other group members spoke. Patient was able to engage with the  and interact with the other group members in an appropriate manner.

## 2025-07-07 NOTE — BH INPATIENT PSYCHIATRY PROGRESS NOTE - NSBHCHARTREVIEWVS_PSY_A_CORE FT
Vital Signs Last 24 Hrs  T(C): 36.7 (07-05-25 @ 08:16), Max: 36.9 (07-04-25 @ 20:20)  T(F): 98.1 (07-05-25 @ 08:16), Max: 98.4 (07-04-25 @ 20:20)  HR: --  BP: --  BP(mean): --  RR: --  SpO2: --    Orthostatic VS  07-05-25 @ 08:16  Lying BP: --/-- HR: --  Sitting BP: 113/64 HR: 95  Standing BP: 130/66 HR: 88  Site: --  Mode: --  Orthostatic VS  07-04-25 @ 20:20  Lying BP: --/-- HR: --  Sitting BP: 124/66 HR: 78  Standing BP: --/-- HR: --  Site: --  Mode: --  Orthostatic VS  07-04-25 @ 08:00  Lying BP: --/-- HR: --  Sitting BP: 112/62 HR: 83  Standing BP: 118/58 HR: 70  Site: --  Mode: --  Orthostatic VS  07-03-25 @ 20:09  Lying BP: --/-- HR: --  Sitting BP: 125/66 HR: 99  Standing BP: 130/60 HR: 97  Site: --  Mode: --  
Vital Signs Last 24 Hrs  T(C): 36.7 (07-07-25 @ 08:38), Max: 36.7 (07-07-25 @ 08:38)  T(F): 98.1 (07-07-25 @ 08:38), Max: 98.1 (07-07-25 @ 08:38)  HR: --  BP: --  BP(mean): --  RR: --  SpO2: --    Orthostatic VS  07-07-25 @ 08:38  Lying BP: --/-- HR: --  Sitting BP: 122/64 HR: 105  Standing BP: 118/54 HR: 90  Site: --  Mode: --  Orthostatic VS  07-06-25 @ 08:20  Lying BP: --/-- HR: --  Sitting BP: 103/61 HR: 91  Standing BP: 108/62 HR: 82  Site: --  Mode: --  Orthostatic VS  07-05-25 @ 18:33  Lying BP: --/-- HR: --  Sitting BP: 124/67 HR: 67  Standing BP: 124/71 HR: 66  Site: --  Mode: --  
Vital Signs Last 24 Hrs  T(C): 37 (07-02-25 @ 17:40), Max: 37.1 (07-02-25 @ 15:08)  T(F): 98.6 (07-02-25 @ 17:40), Max: 98.8 (07-02-25 @ 15:08)  HR: 78 (07-02-25 @ 17:40) (78 - 81)  BP: 134/75 (07-02-25 @ 17:40) (134/63 - 134/75)  BP(mean): --  RR: 18 (07-02-25 @ 17:40) (18 - 18)  SpO2: 97% (07-02-25 @ 17:40) (97% - 97%)    Orthostatic VS  07-03-25 @ 08:19  Lying BP: --/-- HR: --  Sitting BP: 101/31 HR: 87  Standing BP: 104/60 HR: 101  Site: --  Mode: --  Orthostatic VS  07-02-25 @ 18:40  Lying BP: --/-- HR: --  Sitting BP: 132/65 HR: 77  Standing BP: 133/55 HR: 81  Site: --  Mode: --  
Vital Signs Last 24 Hrs  T(C): 36.7 (07-06-25 @ 08:20), Max: 36.7 (07-05-25 @ 18:33)  T(F): 98.1 (07-06-25 @ 08:20), Max: 98.1 (07-05-25 @ 18:33)  HR: --  BP: --  BP(mean): --  RR: 18 (07-06-25 @ 08:20) (18 - 18)  SpO2: --    Orthostatic VS  07-06-25 @ 08:20  Lying BP: --/-- HR: --  Sitting BP: 103/61 HR: 91  Standing BP: 108/62 HR: 82  Site: --  Mode: --  Orthostatic VS  07-05-25 @ 18:33  Lying BP: --/-- HR: --  Sitting BP: 124/67 HR: 67  Standing BP: 124/71 HR: 66  Site: --  Mode: --  Orthostatic VS  07-05-25 @ 08:16  Lying BP: --/-- HR: --  Sitting BP: 113/64 HR: 95  Standing BP: 130/66 HR: 88  Site: --  Mode: --  Orthostatic VS  07-04-25 @ 20:20  Lying BP: --/-- HR: --  Sitting BP: 124/66 HR: 78  Standing BP: --/-- HR: --  Site: --  Mode: --  
Vital Signs Last 24 Hrs  T(C): 37 (07-04-25 @ 08:00), Max: 37 (07-04-25 @ 08:00)  T(F): 98.6 (07-04-25 @ 08:00), Max: 98.6 (07-04-25 @ 08:00)  HR: --  BP: --  BP(mean): --  RR: --  SpO2: --    Orthostatic VS  07-04-25 @ 08:00  Lying BP: --/-- HR: --  Sitting BP: 112/62 HR: 83  Standing BP: 118/58 HR: 70  Site: --  Mode: --  Orthostatic VS  07-03-25 @ 20:09  Lying BP: --/-- HR: --  Sitting BP: 125/66 HR: 99  Standing BP: 130/60 HR: 97  Site: --  Mode: --  Orthostatic VS  07-03-25 @ 08:19  Lying BP: --/-- HR: --  Sitting BP: 101/31 HR: 87  Standing BP: 104/60 HR: 101  Site: --  Mode: --  Orthostatic VS  07-02-25 @ 18:40  Lying BP: --/-- HR: --  Sitting BP: 132/65 HR: 77  Standing BP: 133/55 HR: 81  Site: --  Mode: --

## 2025-07-07 NOTE — BH INPATIENT PSYCHIATRY PROGRESS NOTE - NSBHMSETHTCONTENT_PSY_A_CORE
Preoccupations/Guilt/Suicidality

## 2025-07-07 NOTE — BH INPATIENT PSYCHIATRY PROGRESS NOTE - NSICDXBHPRIMARYDX_PSY_ALL_CORE
Adjustment disorder with mixed disturbance of emotions and conduct   F43.25  

## 2025-07-07 NOTE — BH INPATIENT PSYCHIATRY DISCHARGE NOTE - NSBHASSESSSUMMFT_PSY_ALL_CORE
25 year old male with Alcohol Use Disorder, uncomplicated with no known hx of complex withdrawals/seizures, daily vapes / smoked 1ppd cigarettes, with one prior self-aborted suicide attempt summer of '24 (ingested prescribed antidepressants with subsequent self-induced vomiting), prior remote antidepressant use (unclear if a full trials), presenting after taking substantial step towards suicide while intoxicated on alcohol in the context of low mood x 2 years which worsened in the last month or so with loss of grandparent and most recently beloved pet he has had since childhood, as well as recalling prior relationship hurt. Patient is at an acute risk to self and [place] [place]:    on assessment, patient appears to be minimizing depression symptoms, currently denies SI/I/P, in agreement to start Lexapro for anxiety/ depression sx.   -start Lexapro 5mg daily    7/7: Patient has been in good behavioral control on the unit. He now denies any SI and reports finding lexapro beneficial. Parents called to share that they feel patient is doing better as well. Coordinating with SW to secure aftercare. Tentative plan for d/c tomorrow.    - admit to [facility] on a 9.27  - regular diet/activity/ambulation  - symptoms triggered CIWA; while no history of withdrawals, patient has been consuming more than usual  - nicotine replacement (patch and PRN gum)  - no medical issues, not on any medical medications  - PRN psychiatric medications for now; deferring start of a standing agent to inpt team    - would benefit from also going to addiction services post discharge (discussed with Pt) 25 year old male with Alcohol Use Disorder, uncomplicated with no known hx of complex withdrawals/seizures, daily vapes / smoked 1ppd cigarettes, with one prior self-aborted suicide attempt summer of '24 (ingested prescribed antidepressants with subsequent self-induced vomiting), prior remote antidepressant use (unclear if a full trials), presenting after taking substantial step towards suicide while intoxicated with alcohol in the context of low mood x 2 years which worsened in the last month or so with loss of grandparent and most recently beloved pet he has had since childhood. Patient is was thought to be an acute risk to self and met standard for involuntary admission and transferred to us from [facility] ED on a 927 legal status.    His hospitalization was uneventful.   He did not require IM prns or restraints.  There was no inappropriate behavior.  He was started on escitalopram 5mg daily and was compliant.  He used nictoine and melatonin prns.  Although he was reportedly intoxicated at ED () and reported increased alcohol intake, his CIWA scores were 0 and he did not require CIWA prns and there was no evidence of withdrawal.       He reported improved mood and decreased anxiety.  Throughout the hospitalization he consistently denied si/h intents or plans.  There was no hopelessness, helplessness, worthlessness.  There was no psychomotor agitation or retardation.  He did express guilt and remorse about the episode.  He participated in groups and activities and was visible on the unit.  He slept well.  His acute presenting symptoms greatly improved.  He no longer met criteria for involuntary admission and was discharged home to his parents with outpatient follow up at [facility].  He is discharged with a 30 day supply of lexapro 5mg daily and melatonin.

## 2025-07-07 NOTE — BH INPATIENT PSYCHIATRY DISCHARGE NOTE - NSBHFUPINTERVALHXFT_PSY_A_CORE
Chart reviewed and case discussed with interdisciplinary team. No acute events over the weekend. On my interview with patient today, he states that he is doing well and sleeping well here. Denies any alcohol cravings. Feels lexapro has been helpful. Adamantly denies any SI. Hopes to discharge home soon. Patient's mom called unit to share that she feels patient is doing better and feels he can return home. No interval events, compliant with meds, slept.  He is up and about. He is pleasant, a bit embarrassed and attributes the episode to drinking. He denies medication side effects.  He is future oriented and pleasant.  He continues to deny si/hi intents or plans.  Reports, "I am done" with drinking.

## 2025-07-07 NOTE — BH INPATIENT PSYCHIATRY PROGRESS NOTE - NSTXTOBACOGOAL_PSY_ALL_CORE
Will identify 3 triggers that exacerbate nicotine craving

## 2025-07-07 NOTE — BH INPATIENT PSYCHIATRY DISCHARGE NOTE - NSDCMRMEDTOKEN_GEN_ALL_CORE_FT
escitalopram 5 mg oral tablet: 1 tab(s) orally once a day  melatonin 5 mg oral tablet: 1 tab(s) orally once a day (at bedtime) as needed for  insomnia  nicotine 2 mg oral transmucosal gum: 1 gum oral transmucosal every 3 hours as needed for smoking cessation

## 2025-07-07 NOTE — BH INPATIENT PSYCHIATRY PROGRESS NOTE - NSTXSUBMISGOAL_PSY_ALL_CORE
Be able to verbalize an understanding of the association between substance abuse and mental health

## 2025-07-07 NOTE — BH PSYCHOLOGY - GROUP THERAPY NOTE - NSPSYCHOLGRPGENPT_PSY_A_CORE FT
Patient attended the cognitive behavior therapy group session. Group focused on topics including identifying stress, understanding the physical reactions to stress, and learning adaptive coping methods. The focus of learning how to manage stress rather than its elimination was also reviewed. Group expectations and guidelines, as well as confidentiality and its limitations, were addressed. Principles of cognitive behavior therapy related to today's group topic were reviewed and discussed. Group members illustrated understanding of these concepts by giving personal examples based on their current experiences. Discussions stemmed from the group topics to the causal connection between thoughts, feelings, and behaviors.

## 2025-07-07 NOTE — BH INPATIENT PSYCHIATRY DISCHARGE NOTE - REASON FOR ADMISSION
You were brought to the ED by EMS after you called friends from a parking structure and expressed suicidal ideation.

## 2025-07-07 NOTE — BH PSYCHOLOGY - GROUP THERAPY NOTE - TOKEN PULL-DIAGNOSIS
Primary Diagnosis:  Adjustment disorder with mixed disturbance of emotions and conduct [F43.25]        Problem Dx:   Moderate alcohol use disorder [F10.20]

## 2025-07-07 NOTE — BH INPATIENT PSYCHIATRY DISCHARGE NOTE - NSBHMETABOLIC_PSY_ALL_CORE_FT
BMI: BMI (kg/m2): 19.6 (07-02-25 @ 18:40)  HbA1c:   Glucose:   BP: --Vital Signs Last 24 Hrs  T(C): 36.7 (07-07-25 @ 08:38), Max: 36.7 (07-07-25 @ 08:38)  T(F): 98.1 (07-07-25 @ 08:38), Max: 98.1 (07-07-25 @ 08:38)  HR: --  BP: --  BP(mean): --  RR: --  SpO2: --    Orthostatic VS  07-07-25 @ 08:38  Lying BP: --/-- HR: --  Sitting BP: 122/64 HR: 105  Standing BP: 118/54 HR: 90  Site: --  Mode: --  Orthostatic VS  07-06-25 @ 08:20  Lying BP: --/-- HR: --  Sitting BP: 103/61 HR: 91  Standing BP: 108/62 HR: 82  Site: --  Mode: --  Orthostatic VS  07-05-25 @ 18:33  Lying BP: --/-- HR: --  Sitting BP: 124/67 HR: 67  Standing BP: 124/71 HR: 66  Site: --  Mode: --    Lipid Panel:

## 2025-07-07 NOTE — BH INPATIENT PSYCHIATRY PROGRESS NOTE - CURRENT MEDICATION
MEDICATIONS  (STANDING):  escitalopram 5 milliGRAM(s) Oral daily  nicotine -  14 mG/24Hr(s) Patch 1 Patch Transdermal daily    MEDICATIONS  (PRN):  acetaminophen     Tablet .. 650 milliGRAM(s) Oral every 6 hours PRN Temp greater or equal to 38C (100.4F), Mild Pain (1 - 3)  aluminum hydroxide/magnesium hydroxide/simethicone Suspension 30 milliLiter(s) Oral every 6 hours PRN Dyspepsia  diphenhydrAMINE Injectable 50 milliGRAM(s) IntraMuscular once PRN severe agitation due to mood episode  hydrOXYzine hydrochloride 50 milliGRAM(s) Oral every 4 hours PRN Anxiety  LORazepam     Tablet 2 milliGRAM(s) Oral every 2 hours PRN for CIWA score 8 to 12  LORazepam   Injectable 2 milliGRAM(s) IntraMuscular every 2 hours PRN for CIWA score 13 or higher  magnesium hydroxide Suspension 30 milliLiter(s) Oral daily PRN Constipation  melatonin. 5 milliGRAM(s) Oral at bedtime PRN Insomnia  nicotine  Polacrilex Gum 2 milliGRAM(s) Oral every 3 hours PRN breakthrough cravings  ondansetron    Tablet 4 milliGRAM(s) Oral three times a day PRN nausea  traZODone 50 milliGRAM(s) Oral at bedtime PRN insomnia  
MEDICATIONS  (STANDING):  nicotine -  14 mG/24Hr(s) Patch 1 Patch Transdermal daily    MEDICATIONS  (PRN):  acetaminophen     Tablet .. 650 milliGRAM(s) Oral every 6 hours PRN Temp greater or equal to 38C (100.4F), Mild Pain (1 - 3)  aluminum hydroxide/magnesium hydroxide/simethicone Suspension 30 milliLiter(s) Oral every 6 hours PRN Dyspepsia  diphenhydrAMINE Injectable 50 milliGRAM(s) IntraMuscular once PRN severe agitation due to mood episode  hydrOXYzine hydrochloride 50 milliGRAM(s) Oral every 4 hours PRN Anxiety  LORazepam     Tablet 2 milliGRAM(s) Oral every 2 hours PRN for CIWA score 8 to 12  LORazepam   Injectable 2 milliGRAM(s) IntraMuscular every 2 hours PRN for CIWA score 13 or higher  magnesium hydroxide Suspension 30 milliLiter(s) Oral daily PRN Constipation  nicotine  Polacrilex Gum 2 milliGRAM(s) Oral every 3 hours PRN breakthrough cravings  ondansetron    Tablet 4 milliGRAM(s) Oral three times a day PRN nausea  traZODone 50 milliGRAM(s) Oral at bedtime PRN insomnia

## 2025-07-07 NOTE — BH INPATIENT PSYCHIATRY PROGRESS NOTE - NSICDXBHSECONDARYDX_PSY_ALL_CORE
Moderate alcohol use disorder   F10.20  

## 2025-07-07 NOTE — BH INPATIENT PSYCHIATRY PROGRESS NOTE - NSBHATTESTBILLING_PSY_A_CORE
75594-Ucuskjtbfc OBS or IP - moderate complexity OR 35-49 mins
87502-Rnfdjygevd OBS or IP - moderate complexity OR 35-49 mins
35627-Mdsyooyxrm OBS or IP - moderate complexity OR 35-49 mins
16515-Mgyaqkxadf OBS or IP - moderate complexity OR 35-49 mins
14548-Jdwtpkbofc OBS or IP - moderate complexity OR 35-49 mins

## 2025-07-08 VITALS — TEMPERATURE: 99 F | SYSTOLIC BLOOD PRESSURE: 109 MMHG | HEART RATE: 92 BPM | DIASTOLIC BLOOD PRESSURE: 68 MMHG

## 2025-07-08 RX ADMIN — NICOTINE POLACRILEX 2 MILLIGRAM(S): 4 GUM, CHEWING ORAL at 09:38

## 2025-07-08 RX ADMIN — ESCITALOPRAM OXALATE 5 MILLIGRAM(S): 20 TABLET ORAL at 08:14

## 2025-07-08 NOTE — BH DISCHARGE NOTE NURSING/SOCIAL WORK/PSYCH REHAB - FINANCIAL ASSISTANCE
[facility] provides services at a reduced cost to those who are determined to be eligible through [facility]’s financial assistance program. Information regarding [facility]’s financial assistance program can be found by going to [email or web address] or by calling [phone].

## 2025-07-08 NOTE — BH DISCHARGE NOTE NURSING/SOCIAL WORK/PSYCH REHAB - DISCHARGE INSTRUCTIONS AFTERCARE APPOINTMENTS
In order to check the location, date, or time of your aftercare appointment, please refer to your Discharge Instructions Document given to you upon leaving the hospital.  If you have lost the instructions please call [phone]

## 2025-07-08 NOTE — BH DISCHARGE NOTE NURSING/SOCIAL WORK/PSYCH REHAB - NSDCPRGOAL_PSY_ALL_CORE
Writer met with patient in order to review progress toward rehabilitation goals and assess current functioning. Patient was hospitalized due to worsening depression and SI secondary to psychosocial stressors and alcohol use.  Patient made progress with his rehabilitation goals, as he was able to engage with writer in establishing a safety plan. On the unit, patient was visible and engage in groups. Patient was compliant with medication and tending to his ADLs. Patient reported improvement in mood and denied AH/VH/SI/HI.

## 2025-07-08 NOTE — BH DISCHARGE NOTE NURSING/SOCIAL WORK/PSYCH REHAB - NSDCPEEMAIL_GEN_ALL_CORE
[facility] for Tobacco Control email [email or web address]

## 2025-07-08 NOTE — BH DISCHARGE NOTE NURSING/SOCIAL WORK/PSYCH REHAB - NSCDUDCCRISIS_PSY_A_CORE
.National Suicide Prevention Lifeline 7 (089) 901-6612/.  [employer]  1 (995) LIFENET (562-2905)/.  [facility]  [phone] 590-1233/.  [facility] Behavioral Health Helpline / [facility] Crisis  [phone] 524-GNRZ (2176)/.  [facility]  [address] [place] [phone]   Hours:  Monday through Friday from 9 AM to 3 PM/988 Suicide and Crisis Lifeline

## 2025-07-08 NOTE — BH DISCHARGE NOTE NURSING/SOCIAL WORK/PSYCH REHAB - NSDCPEWEB_GEN_ALL_CORE
[facility] for Tobacco Control website --- [email or web address] website --- [email or web address]

## 2025-07-08 NOTE — BH DISCHARGE NOTE NURSING/SOCIAL WORK/PSYCH REHAB - PATIENT PORTAL LINK FT
You can access the FollowMyHealth Patient Portal offered by [facility] by registering at the following website: [email or web address]. By joining [employer]’s FollowMyHealth portal, you will also be able to view your health information using other applications (apps) compatible with our system.

## 2025-08-15 NOTE — SOCIAL WORK POST DISCHARGE FOLLOW UP NOTE - NSBHSWFOLLOWUP_PSY_ALL_CORE_FT
Covering SW was reached out to by RN that patient was on phone asking for the information for their appointment. SW printed LIS in order to obtain information and provided contact and name to RN to relay message. Covering SW to inform patient's SW of call.

## 2025-08-15 NOTE — SOCIAL WORK POST DISCHARGE FOLLOW UP NOTE - NSBHSWFOLLOWUP_PSY_ALL_CORE_FT
WALT was informed that  was unable to confirm pt's 7/14 virtual appointment with BEN Linder, practitioner on [place]. Called pt's phone number [phone] on several occasions, however it went straight to voicemail. Today, SW sent follow up email to pt using email on file. Awaiting response.